# Patient Record
Sex: MALE | Race: WHITE | NOT HISPANIC OR LATINO | Employment: FULL TIME | URBAN - METROPOLITAN AREA
[De-identification: names, ages, dates, MRNs, and addresses within clinical notes are randomized per-mention and may not be internally consistent; named-entity substitution may affect disease eponyms.]

---

## 2023-08-07 ENCOUNTER — LAB (OUTPATIENT)
Dept: LAB | Facility: LAB | Age: 63
End: 2023-08-07
Payer: COMMERCIAL

## 2023-08-07 ENCOUNTER — OFFICE VISIT (OUTPATIENT)
Dept: PRIMARY CARE | Facility: CLINIC | Age: 63
End: 2023-08-07
Payer: COMMERCIAL

## 2023-08-07 VITALS
DIASTOLIC BLOOD PRESSURE: 64 MMHG | WEIGHT: 111.4 LBS | HEIGHT: 70 IN | OXYGEN SATURATION: 96 % | BODY MASS INDEX: 15.95 KG/M2 | SYSTOLIC BLOOD PRESSURE: 122 MMHG | HEART RATE: 58 BPM

## 2023-08-07 DIAGNOSIS — Z12.5 SCREENING PSA (PROSTATE SPECIFIC ANTIGEN): ICD-10-CM

## 2023-08-07 DIAGNOSIS — R79.89 ABNORMAL CBC: Primary | ICD-10-CM

## 2023-08-07 DIAGNOSIS — R73.09 ELEVATED GLUCOSE LEVEL: ICD-10-CM

## 2023-08-07 DIAGNOSIS — J43.2 CENTRILOBULAR EMPHYSEMA (MULTI): ICD-10-CM

## 2023-08-07 DIAGNOSIS — E16.9 DISORDER OF PANCREATIC INTERNAL SECRETION (HHS-HCC): ICD-10-CM

## 2023-08-07 DIAGNOSIS — E55.9 VITAMIN D DEFICIENCY: ICD-10-CM

## 2023-08-07 DIAGNOSIS — D69.6 THROMBOCYTOPENIA (CMS-HCC): ICD-10-CM

## 2023-08-07 DIAGNOSIS — J43.2 CENTRILOBULAR EMPHYSEMA (MULTI): Primary | ICD-10-CM

## 2023-08-07 PROBLEM — R91.8 LUNG NODULES: Status: ACTIVE | Noted: 2023-08-07

## 2023-08-07 PROBLEM — G47.33 OBSTRUCTIVE SLEEP APNEA, ADULT: Status: ACTIVE | Noted: 2023-08-07

## 2023-08-07 PROBLEM — K86.3 PANCREATIC PSEUDOCYST (HHS-HCC): Status: ACTIVE | Noted: 2023-08-07

## 2023-08-07 PROBLEM — K86.1 CHRONIC PANCREATITIS (MULTI): Status: ACTIVE | Noted: 2023-08-07

## 2023-08-07 PROBLEM — K86.89 SECONDARY PANCREATIC INSUFFICIENCY (HHS-HCC): Status: ACTIVE | Noted: 2023-08-07

## 2023-08-07 LAB
ALANINE AMINOTRANSFERASE (SGPT) (U/L) IN SER/PLAS: 16 U/L (ref 10–52)
ALBUMIN (G/DL) IN SER/PLAS: 4.1 G/DL (ref 3.4–5)
ALKALINE PHOSPHATASE (U/L) IN SER/PLAS: 130 U/L (ref 33–136)
ANION GAP IN SER/PLAS: 9 MMOL/L (ref 10–20)
ASPARTATE AMINOTRANSFERASE (SGOT) (U/L) IN SER/PLAS: 18 U/L (ref 9–39)
BASOPHILS (10*3/UL) IN BLOOD BY AUTOMATED COUNT: 0.08 X10E9/L (ref 0–0.1)
BASOPHILS/100 LEUKOCYTES IN BLOOD BY AUTOMATED COUNT: 1.3 % (ref 0–2)
BILIRUBIN TOTAL (MG/DL) IN SER/PLAS: 0.6 MG/DL (ref 0–1.2)
CALCIDIOL (25 OH VITAMIN D3) (NG/ML) IN SER/PLAS: <7 NG/ML
CALCIUM (MG/DL) IN SER/PLAS: 9.1 MG/DL (ref 8.6–10.3)
CARBON DIOXIDE, TOTAL (MMOL/L) IN SER/PLAS: 33 MMOL/L (ref 21–32)
CHLORIDE (MMOL/L) IN SER/PLAS: 103 MMOL/L (ref 98–107)
CHOLESTEROL (MG/DL) IN SER/PLAS: 156 MG/DL (ref 0–199)
CHOLESTEROL IN HDL (MG/DL) IN SER/PLAS: 48 MG/DL
CHOLESTEROL/HDL RATIO: 3.3
COBALAMIN (VITAMIN B12) (PG/ML) IN SER/PLAS: 307 PG/ML (ref 211–911)
CREATININE (MG/DL) IN SER/PLAS: 0.7 MG/DL (ref 0.5–1.3)
EOSINOPHILS (10*3/UL) IN BLOOD BY AUTOMATED COUNT: 0.23 X10E9/L (ref 0–0.7)
EOSINOPHILS/100 LEUKOCYTES IN BLOOD BY AUTOMATED COUNT: 3.8 % (ref 0–6)
ERYTHROCYTE DISTRIBUTION WIDTH (RATIO) BY AUTOMATED COUNT: 13.1 % (ref 11.5–14.5)
ERYTHROCYTE MEAN CORPUSCULAR HEMOGLOBIN CONCENTRATION (G/DL) BY AUTOMATED: 32 G/DL (ref 32–36)
ERYTHROCYTE MEAN CORPUSCULAR VOLUME (FL) BY AUTOMATED COUNT: 92 FL (ref 80–100)
ERYTHROCYTES (10*6/UL) IN BLOOD BY AUTOMATED COUNT: 5.13 X10E12/L (ref 4.5–5.9)
ESTIMATED AVERAGE GLUCOSE FOR HBA1C: 134 MG/DL
GFR MALE: >90 ML/MIN/1.73M2
GLUCOSE (MG/DL) IN SER/PLAS: 106 MG/DL (ref 74–99)
HEMATOCRIT (%) IN BLOOD BY AUTOMATED COUNT: 47.2 % (ref 41–52)
HEMOGLOBIN (G/DL) IN BLOOD: 15.1 G/DL (ref 13.5–17.5)
HEMOGLOBIN A1C/HEMOGLOBIN TOTAL IN BLOOD: 6.3 %
IMMATURE GRANULOCYTES/100 LEUKOCYTES IN BLOOD BY AUTOMATED COUNT: 0.5 % (ref 0–0.9)
LDL: 94 MG/DL (ref 0–99)
LEUKOCYTES (10*3/UL) IN BLOOD BY AUTOMATED COUNT: 6.1 X10E9/L (ref 4.4–11.3)
LYMPHOCYTES (10*3/UL) IN BLOOD BY AUTOMATED COUNT: 1.59 X10E9/L (ref 1.2–4.8)
LYMPHOCYTES/100 LEUKOCYTES IN BLOOD BY AUTOMATED COUNT: 26.3 % (ref 13–44)
MAGNESIUM (MG/DL) IN SER/PLAS: 1.93 MG/DL (ref 1.6–2.4)
MONOCYTES (10*3/UL) IN BLOOD BY AUTOMATED COUNT: 0.47 X10E9/L (ref 0.1–1)
MONOCYTES/100 LEUKOCYTES IN BLOOD BY AUTOMATED COUNT: 7.8 % (ref 2–10)
NEUTROPHILS (10*3/UL) IN BLOOD BY AUTOMATED COUNT: 3.65 X10E9/L (ref 1.2–7.7)
NEUTROPHILS/100 LEUKOCYTES IN BLOOD BY AUTOMATED COUNT: 60.3 % (ref 40–80)
PLATELET CLUMP (PRESENCE) IN BLOOD BY LIGHT MICROSCOPY: PRESENT
PLATELETS (10*3/UL) IN BLOOD AUTOMATED COUNT: 28 X10E9/L (ref 150–450)
POTASSIUM (MMOL/L) IN SER/PLAS: 4.2 MMOL/L (ref 3.5–5.3)
PROSTATE SPECIFIC AG (NG/ML) IN SER/PLAS: 0.77 NG/ML (ref 0–4)
PROTEIN TOTAL: 6.3 G/DL (ref 6.4–8.2)
RBC MORPHOLOGY IN BLOOD: NORMAL
SODIUM (MMOL/L) IN SER/PLAS: 141 MMOL/L (ref 136–145)
THYROTROPIN (MIU/L) IN SER/PLAS BY DETECTION LIMIT <= 0.05 MIU/L: 1.07 MIU/L (ref 0.44–3.98)
TRIGLYCERIDE (MG/DL) IN SER/PLAS: 72 MG/DL (ref 0–149)
UREA NITROGEN (MG/DL) IN SER/PLAS: 9 MG/DL (ref 6–23)
VLDL: 14 MG/DL (ref 0–40)

## 2023-08-07 PROCEDURE — 80061 LIPID PANEL: CPT

## 2023-08-07 PROCEDURE — 83036 HEMOGLOBIN GLYCOSYLATED A1C: CPT

## 2023-08-07 PROCEDURE — 84443 ASSAY THYROID STIM HORMONE: CPT

## 2023-08-07 PROCEDURE — 85025 COMPLETE CBC W/AUTO DIFF WBC: CPT

## 2023-08-07 PROCEDURE — 36415 COLL VENOUS BLD VENIPUNCTURE: CPT

## 2023-08-07 PROCEDURE — 1036F TOBACCO NON-USER: CPT | Performed by: FAMILY MEDICINE

## 2023-08-07 PROCEDURE — 84153 ASSAY OF PSA TOTAL: CPT

## 2023-08-07 PROCEDURE — 82607 VITAMIN B-12: CPT

## 2023-08-07 PROCEDURE — 80053 COMPREHEN METABOLIC PANEL: CPT

## 2023-08-07 PROCEDURE — 83735 ASSAY OF MAGNESIUM: CPT

## 2023-08-07 PROCEDURE — 99213 OFFICE O/P EST LOW 20 MIN: CPT | Performed by: FAMILY MEDICINE

## 2023-08-07 PROCEDURE — 82306 VITAMIN D 25 HYDROXY: CPT

## 2023-08-07 RX ORDER — PANCRELIPASE 36000; 180000; 114000 [USP'U]/1; [USP'U]/1; [USP'U]/1
CAPSULE, DELAYED RELEASE PELLETS ORAL
COMMUNITY
End: 2023-08-07 | Stop reason: SDUPTHER

## 2023-08-07 RX ORDER — BUDESONIDE, GLYCOPYRROLATE, AND FORMOTEROL FUMARATE 160; 9; 4.8 UG/1; UG/1; UG/1
AEROSOL, METERED RESPIRATORY (INHALATION) EVERY 12 HOURS
COMMUNITY
Start: 2021-04-22 | End: 2024-05-29 | Stop reason: SDUPTHER

## 2023-08-07 RX ORDER — PANCRELIPASE 36000; 180000; 114000 [USP'U]/1; [USP'U]/1; [USP'U]/1
2 CAPSULE, DELAYED RELEASE PELLETS ORAL
Qty: 180 CAPSULE | Refills: 5 | Status: SHIPPED | OUTPATIENT
Start: 2023-08-07 | End: 2023-08-22

## 2023-08-07 RX ORDER — ALBUTEROL SULFATE 90 UG/1
2 AEROSOL, METERED RESPIRATORY (INHALATION) EVERY 4 HOURS PRN
COMMUNITY
End: 2024-05-29 | Stop reason: SDUPTHER

## 2023-08-07 ASSESSMENT — PATIENT HEALTH QUESTIONNAIRE - PHQ9
SUM OF ALL RESPONSES TO PHQ9 QUESTIONS 1 AND 2: 0
2. FEELING DOWN, DEPRESSED OR HOPELESS: NOT AT ALL
1. LITTLE INTEREST OR PLEASURE IN DOING THINGS: NOT AT ALL

## 2023-08-07 NOTE — PROGRESS NOTES
Subjective   Dallin Crawford is a 63 y.o. male who presents for No chief complaint on file..  Here for routine f/u pancreatic insufficiency, COPD (Dr Whitfield).  He states that he is doing reasonably well.      Colonoscopy 10/21 - repeat in 5 years.            Objective   Visit Vitals  /64 (BP Location: Left arm, Patient Position: Sitting, BP Cuff Size: Adult)   Pulse 58      Physical Exam  Vitals reviewed.   Constitutional:       General: He is not in acute distress.  Cardiovascular:      Rate and Rhythm: Normal rate and regular rhythm.      Heart sounds: No murmur heard.  Pulmonary:      Effort: Pulmonary effort is normal. No respiratory distress.      Breath sounds: Normal breath sounds.   Skin:     General: Skin is warm and dry.   Neurological:      General: No focal deficit present.      Mental Status: He is alert. Mental status is at baseline.         Assessment/Plan   Problem List Items Addressed This Visit       Centrilobular emphysema (CMS/HCC) - Primary    Relevant Orders    CBC and Auto Differential    Comprehensive Metabolic Panel    Lipid Panel    TSH with reflex to Free T4 if abnormal    Vitamin B12    Hemoglobin A1C    Magnesium    Vitamin D, Total    Follow Up In Primary Care - Established    Disorder of pancreatic internal secretion    Relevant Medications    pancrelipase, Lip-Prot-Amyl, (Creon) 36,000-114,000- 180,000 unit capsule,delayed release(DR/EC) capsule    Other Relevant Orders    CBC and Auto Differential    Comprehensive Metabolic Panel    Lipid Panel    TSH with reflex to Free T4 if abnormal    Vitamin B12    Hemoglobin A1C    Magnesium    Vitamin D, Total    Follow Up In Primary Care - Established    Elevated glucose level    Relevant Orders    CBC and Auto Differential    Comprehensive Metabolic Panel    Lipid Panel    TSH with reflex to Free T4 if abnormal    Vitamin B12    Hemoglobin A1C    Magnesium    Vitamin D, Total    Follow Up In Primary Care - Established     Thrombocytopenia (CMS/HCC)    Relevant Orders    CBC and Auto Differential    Comprehensive Metabolic Panel    Lipid Panel    TSH with reflex to Free T4 if abnormal    Vitamin B12    Hemoglobin A1C    Magnesium    Vitamin D, Total    Follow Up In Primary Care - Established     Other Visit Diagnoses       Screening PSA (prostate specific antigen)        Relevant Orders    Prostate Specific Antigen    Vitamin D deficiency        Relevant Orders    Vitamin D, Total    Follow Up In Primary Care - Established               Sona Delong MD

## 2023-08-07 NOTE — RESULT ENCOUNTER NOTE
Please let patient know that we need to repeat one of his labs - he does not need to be fasting for this.  The platelet count was low, but there also appeared to be an issue with the tube it was drawn in.  Thanks.

## 2023-08-07 NOTE — PROGRESS NOTES
Subjective   Patient ID: Dallin Crawford is a 63 y.o. male who presents for 6 month follow up. No chief complaint on file..    HPI     Review of Systems    Objective   There were no vitals taken for this visit.    Physical Exam    Assessment/Plan

## 2023-08-07 NOTE — PATIENT INSTRUCTIONS
Get labs today.  Continue current medications.  Follow up with specialists as scheduled.  Follow up in 6 months, sooner if needed.

## 2023-08-14 ENCOUNTER — LAB (OUTPATIENT)
Dept: LAB | Facility: LAB | Age: 63
End: 2023-08-14
Payer: COMMERCIAL

## 2023-08-14 DIAGNOSIS — D69.6 THROMBOCYTOPENIA (CMS-HCC): Primary | ICD-10-CM

## 2023-08-14 DIAGNOSIS — R79.89 ABNORMAL CBC: ICD-10-CM

## 2023-08-14 LAB
BASOPHILS (10*3/UL) IN BLOOD BY AUTOMATED COUNT: 0.07 X10E9/L (ref 0–0.1)
BASOPHILS/100 LEUKOCYTES IN BLOOD BY AUTOMATED COUNT: 1.2 % (ref 0–2)
EOSINOPHILS (10*3/UL) IN BLOOD BY AUTOMATED COUNT: 0.21 X10E9/L (ref 0–0.7)
EOSINOPHILS/100 LEUKOCYTES IN BLOOD BY AUTOMATED COUNT: 3.7 % (ref 0–6)
ERYTHROCYTE DISTRIBUTION WIDTH (RATIO) BY AUTOMATED COUNT: 13.1 % (ref 11.5–14.5)
ERYTHROCYTE MEAN CORPUSCULAR HEMOGLOBIN CONCENTRATION (G/DL) BY AUTOMATED: 31.7 G/DL (ref 32–36)
ERYTHROCYTE MEAN CORPUSCULAR VOLUME (FL) BY AUTOMATED COUNT: 93 FL (ref 80–100)
ERYTHROCYTES (10*6/UL) IN BLOOD BY AUTOMATED COUNT: 4.96 X10E12/L (ref 4.5–5.9)
HEMATOCRIT (%) IN BLOOD BY AUTOMATED COUNT: 46 % (ref 41–52)
HEMOGLOBIN (G/DL) IN BLOOD: 14.6 G/DL (ref 13.5–17.5)
IMMATURE GRANULOCYTES/100 LEUKOCYTES IN BLOOD BY AUTOMATED COUNT: 0.5 % (ref 0–0.9)
LEUKOCYTES (10*3/UL) IN BLOOD BY AUTOMATED COUNT: 5.6 X10E9/L (ref 4.4–11.3)
LYMPHOCYTES (10*3/UL) IN BLOOD BY AUTOMATED COUNT: 1.65 X10E9/L (ref 1.2–4.8)
LYMPHOCYTES/100 LEUKOCYTES IN BLOOD BY AUTOMATED COUNT: 29.4 % (ref 13–44)
MONOCYTES (10*3/UL) IN BLOOD BY AUTOMATED COUNT: 0.56 X10E9/L (ref 0.1–1)
MONOCYTES/100 LEUKOCYTES IN BLOOD BY AUTOMATED COUNT: 10 % (ref 2–10)
NEUTROPHILS (10*3/UL) IN BLOOD BY AUTOMATED COUNT: 3.09 X10E9/L (ref 1.2–7.7)
NEUTROPHILS/100 LEUKOCYTES IN BLOOD BY AUTOMATED COUNT: 55.2 % (ref 40–80)
PLATELETS (10*3/UL) IN BLOOD AUTOMATED COUNT: 26 X10E9/L (ref 150–450)

## 2023-08-14 PROCEDURE — 85025 COMPLETE CBC W/AUTO DIFF WBC: CPT

## 2023-08-14 PROCEDURE — 36415 COLL VENOUS BLD VENIPUNCTURE: CPT

## 2023-08-14 NOTE — TELEPHONE ENCOUNTER
----- Message from Sona Delong MD sent at 8/14/2023 12:31 PM EDT -----  Please let patient know that his platelet count is still very low on repeat test and I would recommend with get him in with hematology asap.  Thanks.

## 2023-08-14 NOTE — RESULT ENCOUNTER NOTE
Please let patient know that his platelet count is still very low on repeat test and I would recommend with get him in with hematology asap.  Thanks.

## 2023-08-22 RX ORDER — PANCRELIPASE 36000; 180000; 114000 [USP'U]/1; [USP'U]/1; [USP'U]/1
2 CAPSULE, DELAYED RELEASE PELLETS ORAL
Qty: 180 CAPSULE | Refills: 5 | Status: SHIPPED | OUTPATIENT
Start: 2023-08-22 | End: 2023-10-23

## 2023-08-23 DIAGNOSIS — E16.9 DISORDER OF PANCREATIC INTERNAL SECRETION (HHS-HCC): ICD-10-CM

## 2023-08-23 RX ORDER — PANCRELIPASE 36000; 180000; 114000 [USP'U]/1; [USP'U]/1; [USP'U]/1
2 CAPSULE, DELAYED RELEASE PELLETS ORAL
Qty: 180 CAPSULE | Refills: 5 | OUTPATIENT
Start: 2023-08-23

## 2023-10-21 DIAGNOSIS — E16.9 DISORDER OF PANCREATIC INTERNAL SECRETION (HHS-HCC): ICD-10-CM

## 2023-10-23 RX ORDER — PANCRELIPASE 36000; 180000; 114000 [USP'U]/1; [USP'U]/1; [USP'U]/1
2 CAPSULE, DELAYED RELEASE PELLETS ORAL
Qty: 180 CAPSULE | Refills: 5 | Status: SHIPPED | OUTPATIENT
Start: 2023-10-23 | End: 2024-01-04

## 2023-11-20 ENCOUNTER — OFFICE VISIT (OUTPATIENT)
Dept: PULMONOLOGY | Facility: CLINIC | Age: 63
End: 2023-11-20
Payer: COMMERCIAL

## 2023-11-20 VITALS
HEART RATE: 90 BPM | OXYGEN SATURATION: 96 % | DIASTOLIC BLOOD PRESSURE: 73 MMHG | BODY MASS INDEX: 15.47 KG/M2 | TEMPERATURE: 96 F | SYSTOLIC BLOOD PRESSURE: 115 MMHG | WEIGHT: 107.8 LBS

## 2023-11-20 DIAGNOSIS — J43.2 CENTRILOBULAR EMPHYSEMA (MULTI): Primary | ICD-10-CM

## 2023-11-20 DIAGNOSIS — R91.8 LUNG NODULES: ICD-10-CM

## 2023-11-20 PROCEDURE — 1036F TOBACCO NON-USER: CPT | Performed by: INTERNAL MEDICINE

## 2023-11-20 PROCEDURE — 99214 OFFICE O/P EST MOD 30 MIN: CPT | Performed by: INTERNAL MEDICINE

## 2023-11-20 NOTE — PROGRESS NOTES
Subjective   Patient ID: Dallin Crawford is a 63 y.o. male who presents for Lung Nodule and Emphysema.  HPI  Patient was seen today in the office on a 3-month follow-up visit for his severe emphysema and also review of his upcoming screen for the lung nodules.  Patient is currently on Breztri at 2 and elations twice a day and albuterol inhaler 2 puffs 4 times daily as needed shortness of breath.  He uses a spacer with both inhalers.    Patient denies any cough or sputum production or wheezing at this time he does have shortness of breath walking from the office to his car and with a flight of stairs at home.  Unfortunately he is still smoking 1 pack/day.  We did discuss smoking cessation clinic at the hospital and I gave him a flyer from them.  I also talked to him about using nicotine lozenges which she has never heard of before and that that might help with his addiction to the nicotine.  Review of Systems  Patient denies having any fever, chills, rhinitis or sore throat.  No contributory symptoms.  Refer to the HPI.  Objective   Physical Exam  HEENT, there is no inflammation on examination of the oral pharynx.  Pulmonary, decreased breath sounds bilaterally and clear to auscultation.  Cardio, heart sounds are regular rate and rhythm.  Extremities, no cyanosis, clubbing or pretibial edema.  Psych, the patient is alert and oriented x3.  Patient is not dyspneic with activity in the office today.  Assessment/Plan        Impressions:  1.  Severe emphysema.  2.  Lung nodules.  Recommendations:  1.  We have him scheduled for an LDCT with his follow-up appointment in April of next year which will be a 1 year exam.  5 months from this office visit.  2.  Continue with present bronchodilator therapy.      This note was transcribed using the Dragon Dictation system.  There may be grammatical, punctuation, or verbiage errors that occur with voice recognition programs.

## 2023-12-11 ENCOUNTER — LAB (OUTPATIENT)
Dept: LAB | Facility: LAB | Age: 63
End: 2023-12-11
Payer: COMMERCIAL

## 2023-12-11 DIAGNOSIS — D69.6 THROMBOCYTOPENIA, UNSPECIFIED (CMS-HCC): Primary | ICD-10-CM

## 2023-12-11 LAB
ALBUMIN SERPL BCP-MCNC: 4.2 G/DL (ref 3.4–5)
ALP SERPL-CCNC: 133 U/L (ref 33–136)
ALT SERPL W P-5'-P-CCNC: 17 U/L (ref 10–52)
ANION GAP SERPL CALC-SCNC: 10 MMOL/L (ref 10–20)
AST SERPL W P-5'-P-CCNC: 19 U/L (ref 9–39)
BASOPHILS # BLD AUTO: 0.13 X10*3/UL (ref 0–0.1)
BASOPHILS NFR BLD AUTO: 1.6 %
BILIRUB SERPL-MCNC: 0.6 MG/DL (ref 0–1.2)
BUN SERPL-MCNC: 7 MG/DL (ref 6–23)
CALCIUM SERPL-MCNC: 9.4 MG/DL (ref 8.6–10.3)
CHLORIDE SERPL-SCNC: 103 MMOL/L (ref 98–107)
CO2 SERPL-SCNC: 35 MMOL/L (ref 21–32)
CREAT SERPL-MCNC: 0.73 MG/DL (ref 0.5–1.3)
EOSINOPHIL # BLD AUTO: 0.23 X10*3/UL (ref 0–0.7)
EOSINOPHIL NFR BLD AUTO: 2.9 %
ERYTHROCYTE [DISTWIDTH] IN BLOOD BY AUTOMATED COUNT: 12.9 % (ref 11.5–14.5)
FERRITIN SERPL-MCNC: 47 NG/ML (ref 20–300)
FOLATE SERPL-MCNC: 18.3 NG/ML
GFR SERPL CREATININE-BSD FRML MDRD: >90 ML/MIN/1.73M*2
GLUCOSE SERPL-MCNC: 108 MG/DL (ref 74–99)
HCT VFR BLD AUTO: 47.1 % (ref 41–52)
HGB BLD-MCNC: 15.1 G/DL (ref 13.5–17.5)
IMM GRANULOCYTES # BLD AUTO: 0.06 X10*3/UL (ref 0–0.7)
IMM GRANULOCYTES NFR BLD AUTO: 0.7 % (ref 0–0.9)
IRON SATN MFR SERPL: 23 % (ref 25–45)
IRON SERPL-MCNC: 83 UG/DL (ref 35–150)
LYMPHOCYTES # BLD AUTO: 2.29 X10*3/UL (ref 1.2–4.8)
LYMPHOCYTES NFR BLD AUTO: 28.5 %
MCH RBC QN AUTO: 29.5 PG (ref 26–34)
MCHC RBC AUTO-ENTMCNC: 32.1 G/DL (ref 32–36)
MCV RBC AUTO: 92 FL (ref 80–100)
MONOCYTES # BLD AUTO: 0.67 X10*3/UL (ref 0.1–1)
MONOCYTES NFR BLD AUTO: 8.3 %
NEUTROPHILS # BLD AUTO: 4.66 X10*3/UL (ref 1.2–7.7)
NEUTROPHILS NFR BLD AUTO: 58 %
NRBC BLD-RTO: 0 /100 WBCS (ref 0–0)
PLATELET # BLD AUTO: 235 X10*3/UL (ref 150–450)
POTASSIUM SERPL-SCNC: 5.5 MMOL/L (ref 3.5–5.3)
PROT SERPL-MCNC: 6.6 G/DL (ref 6.4–8.2)
RBC # BLD AUTO: 5.11 X10*6/UL (ref 4.5–5.9)
SODIUM SERPL-SCNC: 142 MMOL/L (ref 136–145)
TIBC SERPL-MCNC: 368 UG/DL (ref 240–445)
UIBC SERPL-MCNC: 285 UG/DL (ref 110–370)
VIT B12 SERPL-MCNC: 467 PG/ML (ref 211–911)
WBC # BLD AUTO: 8 X10*3/UL (ref 4.4–11.3)

## 2023-12-11 PROCEDURE — 36415 COLL VENOUS BLD VENIPUNCTURE: CPT

## 2023-12-11 PROCEDURE — 83550 IRON BINDING TEST: CPT

## 2023-12-11 PROCEDURE — 82728 ASSAY OF FERRITIN: CPT

## 2023-12-11 PROCEDURE — 82746 ASSAY OF FOLIC ACID SERUM: CPT

## 2023-12-11 PROCEDURE — 83540 ASSAY OF IRON: CPT

## 2023-12-11 PROCEDURE — 82607 VITAMIN B-12: CPT

## 2023-12-11 PROCEDURE — 85025 COMPLETE CBC W/AUTO DIFF WBC: CPT

## 2023-12-11 PROCEDURE — 80053 COMPREHEN METABOLIC PANEL: CPT

## 2023-12-18 ENCOUNTER — OFFICE VISIT (OUTPATIENT)
Dept: HEMATOLOGY/ONCOLOGY | Facility: CLINIC | Age: 63
End: 2023-12-18
Payer: COMMERCIAL

## 2023-12-18 VITALS
OXYGEN SATURATION: 97 % | SYSTOLIC BLOOD PRESSURE: 135 MMHG | HEART RATE: 60 BPM | DIASTOLIC BLOOD PRESSURE: 78 MMHG | BODY MASS INDEX: 16.03 KG/M2 | RESPIRATION RATE: 20 BRPM | WEIGHT: 112 LBS | HEIGHT: 70 IN

## 2023-12-18 DIAGNOSIS — D69.6 THROMBOCYTOPENIA (CMS-HCC): Primary | ICD-10-CM

## 2023-12-18 PROCEDURE — 1036F TOBACCO NON-USER: CPT

## 2023-12-18 PROCEDURE — 99213 OFFICE O/P EST LOW 20 MIN: CPT

## 2023-12-18 ASSESSMENT — PAIN SCALES - GENERAL: PAINLEVEL: 0-NO PAIN

## 2023-12-18 NOTE — PROGRESS NOTES
RTC 5/20/24 AT 9AM FOR FOLLOWUP  INSTRUCTED TO GET LABS THE WEEK PRIOR AT THE [facility]  Reviewed AVS with patient- patient verbalizes understanding

## 2023-12-18 NOTE — PROGRESS NOTES
Patient ID: Dallin Crawford is a 63 y.o. male.    Subjective    HPI  Chief Complaint: Thrombocytopenia   Interval History:    Initial consult: 8/28/23  Reason: Thrombocyopenia  Referred: Sona Delong        Patient is a 62 yo male with a PMH of Emphysema Pancreas Insufficiency, DARIAN, Lung Nodules, Hypoxia and was referred to benign hematology for consultation of thrombocytopenia.      According to our records since November 2020 patient platelets have ranged between 26k-216k.  During this time frame the patients platelets have been reported low four  separate times as indicated, 137k as of May 2022, 138k as of July 2022 and most recently  28k on 8/7/23 and 26k on 8/14/23.      Patient has a history of whipple procedure in 2015 due to having precancerous pancreatic cysts. He remains on creon for pancreatic insuffiencey.  Prior to the surgery he had a history of consuming large amounts of alcohol.      Today, patient presents for initial consultation. Chronic fatigue, sleeps an average of 5 hours daily due to work schedule. Appetite varies-but is mostly decent, sometimes gets full fast, not a new finding since whipple surgery. Patient denies any spontaneous  or major bleeding events, but does bruise easier, especially in the last several months. Weight loss of 4-5lbs over the past month. Occasional Diarrhea if he forgets to take creon or food doesn't agree with him. No urinary issues. No hematochezia, melena  or hematuria. SOB that worsens with activity, occasional productive cough with green to yellow sputum. Denies fevers, chills or night sweats. Denies fatigue, chills, sob, cp, n/v/d, n/t. No PICA. Denies any abnormal bleeding or bruising. No recurrent  infections or lymphadenopathy. No joint/body pain. No known blood disorders in family. Has had surgery in past w/o issue. Never had blood/blood products. Denies NSAID use.      Colonoscopy approximately 2years ago- polyps were found- denies anything  "malicious      Interval History 12/18/23    Energy is decent, fatigued due to long days at work  Appetite is good, eating and drinking well    ROONEY, with any activity   Ppd daily     Slight weight gain of 5 lbs   No nausea/vomiting  No Diarrhea or constipation  No urinary issues  No numbness or tingling in hands or feet   No fevers, night sweats                   Objective    BSA: 1.58 meters squared  /78 (BP Location: Left arm, Patient Position: Sitting, BP Cuff Size: Adult)   Pulse 60   Resp 20   Ht 1.778 m (5' 10\")   Wt 50.8 kg (112 lb)   SpO2 97%   BMI 16.07 kg/m²      Physical Exam  Vitals and nursing note reviewed.   Constitutional:       Appearance: Normal appearance.   HENT:      Head: Normocephalic and atraumatic.      Mouth/Throat:      Mouth: Mucous membranes are moist.      Pharynx: Oropharynx is clear.   Eyes:      General: No scleral icterus.     Extraocular Movements: Extraocular movements intact.      Conjunctiva/sclera: Conjunctivae normal.   Cardiovascular:      Rate and Rhythm: Normal rate and regular rhythm.      Pulses: Normal pulses.      Heart sounds: Normal heart sounds.   Pulmonary:      Effort: Pulmonary effort is normal. No respiratory distress.      Breath sounds: Normal breath sounds.   Abdominal:      General: Bowel sounds are normal. There is no distension.      Palpations: Abdomen is soft.   Musculoskeletal:         General: No swelling. Normal range of motion.      Cervical back: Normal range of motion and neck supple.   Skin:     General: Skin is warm and dry.   Neurological:      General: No focal deficit present.      Mental Status: He is alert and oriented to person, place, and time.   Psychiatric:         Mood and Affect: Mood normal.         Behavior: Behavior normal.         Thought Content: Thought content normal.         Judgment: Judgment normal.         Performance Status:  Asymptomatic      Assessment/Plan      Assessment:    HEME-ONC ASSESSMENT AND PLAN   "   PROBLEMS:  1. Mild thrombocytopenia  - 5-2-22 Platelets 137k. Hgb 15.0, WBC 7.8  - 7-19-22 Platelets 138k, Hgb 15.2, WBC 6.0  -8-7-23 Platelets 28k, Hgb 15.1, WBC 6.1  -8-14-23 Platelets 26k, Hgb 14.6, WBC 5.6  -12-11-23 Platelets 235k, Hgb 15.1, WBC 8.0  -12-11-23 Fe 83, Fe sat 23%,      2. Pancreatic Insufficiency-   -2015 Whipple procedure due to benign pancreatic cysts  - takes Creon daily prior to meals     3. Lung Nodules  - 3-27-23- CT scan noted emphysematous changes, no suspicious parenchymal lung nodules  - managed by Pulmonology      4. Emphysema  - 3-27-23- CT scan reported sever emphysematous changes with adherent mucus  - ppd smoker  - managed by pulmonology         CHUCKY COMMENTS:   -Patient is noted to have a mild thrombocytopenia four times since 2022.  He has remained asymptomatic without any bleeding competitions.  Patient has not had any recent exposure to heparin.  I have a low suspicion for thrombotic microangiopathy but we'll  check a peripheral smear, LDH and a haptoglobin to assess for any hemolysis though it is reassuring that his hemoglobin is normal.  Nutrition deficiencies are also in the differential and will check iron, folate and B12 today.  Underlying bone marrow  condition is less likely given he does not have any other cytopenias.      Immune thrombocytopenia is also in the differential however there are no obvious precipitating factors such as recent infection or drugs.  Given his age it is not likely to have a new autoimmune condition that would precipitate this.     - Discussed possible etiologies including multifactorial, nutritional deficiencies, anemia of chronic disease, malabsorption, hemopathy, medications, bleeding, malignancy, etc. Will start hematological workup today.    12/18/23- Patient in office today with family. WBC remains corrected and stable at 8.0. Patient is doing well. Denies any new clinical concerns or issues. Remainder of CBC is stable. Iron parameters  are stable, no nutritional deficiency indicated. B12 is stable at 472.  Continue taking B12 as same.  Will continue to serial monitor patient CBC, otherwise no further intervention is needed at this time.      PLANS:  - Labs today CBC w/diff, CMP, B12, Folate, Iron Panel, Ferritin  - repeat CBC, CMP prior to follow-up  - Follow-up in 4-5 month              NEGRO Ashley-CNP

## 2024-01-04 DIAGNOSIS — E16.9 DISORDER OF PANCREATIC INTERNAL SECRETION (HHS-HCC): ICD-10-CM

## 2024-01-04 RX ORDER — PANCRELIPASE 36000; 180000; 114000 [USP'U]/1; [USP'U]/1; [USP'U]/1
2 CAPSULE, DELAYED RELEASE PELLETS ORAL
Qty: 180 CAPSULE | Refills: 5 | Status: SHIPPED | OUTPATIENT
Start: 2024-01-04 | End: 2024-01-05

## 2024-01-05 DIAGNOSIS — E16.9 DISORDER OF PANCREATIC INTERNAL SECRETION (HHS-HCC): ICD-10-CM

## 2024-01-05 RX ORDER — PANCRELIPASE 36000; 180000; 114000 [USP'U]/1; [USP'U]/1; [USP'U]/1
2 CAPSULE, DELAYED RELEASE PELLETS ORAL
Qty: 200 CAPSULE | Refills: 5 | Status: SHIPPED | OUTPATIENT
Start: 2024-01-05 | End: 2024-04-29

## 2024-02-05 ENCOUNTER — OFFICE VISIT (OUTPATIENT)
Dept: PRIMARY CARE | Facility: CLINIC | Age: 64
End: 2024-02-05
Payer: COMMERCIAL

## 2024-02-05 VITALS
WEIGHT: 110.5 LBS | HEIGHT: 70 IN | BODY MASS INDEX: 15.82 KG/M2 | OXYGEN SATURATION: 94 % | DIASTOLIC BLOOD PRESSURE: 74 MMHG | SYSTOLIC BLOOD PRESSURE: 126 MMHG | HEART RATE: 64 BPM

## 2024-02-05 DIAGNOSIS — D69.6 THROMBOCYTOPENIA (CMS-HCC): ICD-10-CM

## 2024-02-05 DIAGNOSIS — J43.2 CENTRILOBULAR EMPHYSEMA (MULTI): ICD-10-CM

## 2024-02-05 DIAGNOSIS — R73.09 ELEVATED GLUCOSE LEVEL: ICD-10-CM

## 2024-02-05 DIAGNOSIS — E55.9 VITAMIN D DEFICIENCY: ICD-10-CM

## 2024-02-05 DIAGNOSIS — E16.9 DISORDER OF PANCREATIC INTERNAL SECRETION (HHS-HCC): ICD-10-CM

## 2024-02-05 PROCEDURE — 99214 OFFICE O/P EST MOD 30 MIN: CPT | Performed by: FAMILY MEDICINE

## 2024-02-05 PROCEDURE — 1036F TOBACCO NON-USER: CPT | Performed by: FAMILY MEDICINE

## 2024-02-05 NOTE — PROGRESS NOTES
Subjective   Patient ID: Dallin Crawford is a 63 y.o. male who presents for 6 month check up    HPI     Review of Systems    Objective   There were no vitals taken for this visit.    Physical Exam    Assessment/Plan

## 2024-02-05 NOTE — PROGRESS NOTES
Subjective   Dallin Crawford is a 63 y.o. male who presents for No chief complaint on file..  Here for routine f/u pancreatic insufficiency, COPD (Dr Whitfield).  He states that he is doing reasonably well.  He is having issues gaining weight.  He has a good appetite.  We will check some labs.  He had CT last fall - no concerns for malignancy.        Colonoscopy 10/21 - repeat in 5 years.              Objective   Visit Vitals  /74 (BP Location: Left arm, Patient Position: Sitting, BP Cuff Size: Adult)   Pulse 64      Physical Exam  Vitals reviewed.   Constitutional:       General: He is not in acute distress.  Cardiovascular:      Rate and Rhythm: Normal rate and regular rhythm.      Heart sounds: No murmur heard.  Pulmonary:      Effort: Pulmonary effort is normal. No respiratory distress.      Breath sounds: Normal breath sounds.   Skin:     General: Skin is warm and dry.   Neurological:      General: No focal deficit present.      Mental Status: He is alert. Mental status is at baseline.         Assessment/Plan   Problem List Items Addressed This Visit       Centrilobular emphysema (CMS/HCC)    Relevant Orders    CBC and Auto Differential    Comprehensive Metabolic Panel    Hemoglobin A1C    Lipid Panel    TSH with reflex to Free T4 if abnormal    Vitamin B12    Vitamin D 25-Hydroxy,Total (for eval of Vitamin D levels)    Follow Up In Primary Care - Established    Disorder of pancreatic internal secretion    Relevant Orders    CBC and Auto Differential    Comprehensive Metabolic Panel    Hemoglobin A1C    Lipid Panel    TSH with reflex to Free T4 if abnormal    Vitamin B12    Vitamin D 25-Hydroxy,Total (for eval of Vitamin D levels)    Follow Up In Primary Care - Established    Elevated glucose level    Relevant Orders    CBC and Auto Differential    Comprehensive Metabolic Panel    Hemoglobin A1C    Lipid Panel    TSH with reflex to Free T4 if abnormal    Vitamin B12    Vitamin D 25-Hydroxy,Total (for  eval of Vitamin D levels)    Follow Up In Primary Care - Established    Thrombocytopenia (CMS/HCC)    Relevant Orders    CBC and Auto Differential    Comprehensive Metabolic Panel    Hemoglobin A1C    Lipid Panel    TSH with reflex to Free T4 if abnormal    Vitamin B12    Vitamin D 25-Hydroxy,Total (for eval of Vitamin D levels)    Follow Up In Primary Care - Established     Other Visit Diagnoses       Vitamin D deficiency        Relevant Orders    CBC and Auto Differential    Comprehensive Metabolic Panel    Hemoglobin A1C    Lipid Panel    TSH with reflex to Free T4 if abnormal    Vitamin B12    Vitamin D 25-Hydroxy,Total (for eval of Vitamin D levels)    Follow Up In Primary Care - Established               Sona Delong MD

## 2024-02-05 NOTE — PATIENT INSTRUCTIONS
Continue current medications.  Discussed adding Ensure/Boost to his diet.  Follow up with specialists as scheduled.  Follow up in 6 months, sooner if needed.

## 2024-02-12 ENCOUNTER — TELEPHONE (OUTPATIENT)
Dept: PRIMARY CARE | Facility: CLINIC | Age: 64
End: 2024-02-12

## 2024-02-12 ENCOUNTER — LAB (OUTPATIENT)
Dept: LAB | Facility: LAB | Age: 64
End: 2024-02-12
Payer: COMMERCIAL

## 2024-02-12 DIAGNOSIS — J43.2 CENTRILOBULAR EMPHYSEMA (MULTI): ICD-10-CM

## 2024-02-12 DIAGNOSIS — E55.9 VITAMIN D DEFICIENCY: ICD-10-CM

## 2024-02-12 DIAGNOSIS — D69.6 THROMBOCYTOPENIA (CMS-HCC): ICD-10-CM

## 2024-02-12 DIAGNOSIS — E16.9 DISORDER OF PANCREATIC INTERNAL SECRETION (HHS-HCC): ICD-10-CM

## 2024-02-12 DIAGNOSIS — R73.09 ELEVATED GLUCOSE LEVEL: ICD-10-CM

## 2024-02-12 LAB
25(OH)D3 SERPL-MCNC: 35 NG/ML (ref 30–100)
ALBUMIN SERPL BCP-MCNC: 4 G/DL (ref 3.4–5)
ALP SERPL-CCNC: 114 U/L (ref 33–136)
ALT SERPL W P-5'-P-CCNC: 17 U/L (ref 10–52)
ANION GAP SERPL CALC-SCNC: 10 MMOL/L (ref 10–20)
AST SERPL W P-5'-P-CCNC: 19 U/L (ref 9–39)
BASOPHILS # BLD AUTO: 0.13 X10*3/UL (ref 0–0.1)
BASOPHILS NFR BLD AUTO: 1.5 %
BILIRUB SERPL-MCNC: 0.6 MG/DL (ref 0–1.2)
BUN SERPL-MCNC: 11 MG/DL (ref 6–23)
CALCIUM SERPL-MCNC: 9.2 MG/DL (ref 8.6–10.3)
CHLORIDE SERPL-SCNC: 99 MMOL/L (ref 98–107)
CHOLEST SERPL-MCNC: 149 MG/DL (ref 0–199)
CHOLESTEROL/HDL RATIO: 2.7
CO2 SERPL-SCNC: 34 MMOL/L (ref 21–32)
CREAT SERPL-MCNC: 0.65 MG/DL (ref 0.5–1.3)
EGFRCR SERPLBLD CKD-EPI 2021: >90 ML/MIN/1.73M*2
EOSINOPHIL # BLD AUTO: 0.22 X10*3/UL (ref 0–0.7)
EOSINOPHIL NFR BLD AUTO: 2.6 %
ERYTHROCYTE [DISTWIDTH] IN BLOOD BY AUTOMATED COUNT: 12.8 % (ref 11.5–14.5)
EST. AVERAGE GLUCOSE BLD GHB EST-MCNC: 128 MG/DL
GLUCOSE SERPL-MCNC: 121 MG/DL (ref 74–99)
HBA1C MFR BLD: 6.1 %
HCT VFR BLD AUTO: 46.8 % (ref 41–52)
HDLC SERPL-MCNC: 56 MG/DL
HGB BLD-MCNC: 14.9 G/DL (ref 13.5–17.5)
IMM GRANULOCYTES # BLD AUTO: 0.04 X10*3/UL (ref 0–0.7)
IMM GRANULOCYTES NFR BLD AUTO: 0.5 % (ref 0–0.9)
LDLC SERPL CALC-MCNC: 75 MG/DL
LYMPHOCYTES # BLD AUTO: 2.57 X10*3/UL (ref 1.2–4.8)
LYMPHOCYTES NFR BLD AUTO: 30 %
MCH RBC QN AUTO: 29.8 PG (ref 26–34)
MCHC RBC AUTO-ENTMCNC: 31.8 G/DL (ref 32–36)
MCV RBC AUTO: 94 FL (ref 80–100)
MONOCYTES # BLD AUTO: 0.69 X10*3/UL (ref 0.1–1)
MONOCYTES NFR BLD AUTO: 8.1 %
NEUTROPHILS # BLD AUTO: 4.92 X10*3/UL (ref 1.2–7.7)
NEUTROPHILS NFR BLD AUTO: 57.3 %
NON HDL CHOLESTEROL: 93 MG/DL (ref 0–149)
NRBC BLD-RTO: 0 /100 WBCS (ref 0–0)
PLATELET # BLD AUTO: 367 X10*3/UL (ref 150–450)
POTASSIUM SERPL-SCNC: 4.5 MMOL/L (ref 3.5–5.3)
PROT SERPL-MCNC: 6.3 G/DL (ref 6.4–8.2)
RBC # BLD AUTO: 5 X10*6/UL (ref 4.5–5.9)
SODIUM SERPL-SCNC: 138 MMOL/L (ref 136–145)
TRIGL SERPL-MCNC: 89 MG/DL (ref 0–149)
TSH SERPL-ACNC: 1.23 MIU/L (ref 0.44–3.98)
VIT B12 SERPL-MCNC: 495 PG/ML (ref 211–911)
VLDL: 18 MG/DL (ref 0–40)
WBC # BLD AUTO: 8.6 X10*3/UL (ref 4.4–11.3)

## 2024-02-12 PROCEDURE — 80053 COMPREHEN METABOLIC PANEL: CPT

## 2024-02-12 PROCEDURE — 83036 HEMOGLOBIN GLYCOSYLATED A1C: CPT

## 2024-02-12 PROCEDURE — 36415 COLL VENOUS BLD VENIPUNCTURE: CPT

## 2024-02-12 PROCEDURE — 85025 COMPLETE CBC W/AUTO DIFF WBC: CPT

## 2024-02-12 PROCEDURE — 82306 VITAMIN D 25 HYDROXY: CPT

## 2024-02-12 PROCEDURE — 80061 LIPID PANEL: CPT

## 2024-02-12 PROCEDURE — 84443 ASSAY THYROID STIM HORMONE: CPT

## 2024-02-12 PROCEDURE — 82607 VITAMIN B-12: CPT

## 2024-02-12 NOTE — TELEPHONE ENCOUNTER
He has been on this medication/dose for several years.  Anytime he tries to cut back he has diarrhea and weight loss.

## 2024-02-12 NOTE — TELEPHONE ENCOUNTER
For the Patient Rx Creon. He is out and needs this filled ASAP this is the response I got back. First it was approved but Apparently the dosage isn't approved. Can you give me another more in depth so I can get this approved for the patient.      Message:   Case Id:[id number];  Status:Cancelled;  Explanation:Other. COST EXCEEDS MAXIMUM. DISPENSING PHARMACY MAY NEED TO CALL [phone] FOR OVERRIDE.;   Payer: [employer] Patient's Payer   [phone]

## 2024-04-01 ENCOUNTER — HOSPITAL ENCOUNTER (OUTPATIENT)
Dept: RADIOLOGY | Facility: HOSPITAL | Age: 64
Discharge: HOME | End: 2024-04-01
Payer: COMMERCIAL

## 2024-04-01 DIAGNOSIS — R91.8 LUNG NODULES: ICD-10-CM

## 2024-04-01 PROCEDURE — 71271 CT THORAX LUNG CANCER SCR C-: CPT

## 2024-04-28 DIAGNOSIS — E16.9 DISORDER OF PANCREATIC INTERNAL SECRETION (HHS-HCC): ICD-10-CM

## 2024-04-29 ENCOUNTER — OFFICE VISIT (OUTPATIENT)
Dept: PULMONOLOGY | Facility: CLINIC | Age: 64
End: 2024-04-29
Payer: COMMERCIAL

## 2024-04-29 VITALS
DIASTOLIC BLOOD PRESSURE: 78 MMHG | HEIGHT: 70 IN | BODY MASS INDEX: 14.86 KG/M2 | OXYGEN SATURATION: 91 % | WEIGHT: 103.8 LBS | SYSTOLIC BLOOD PRESSURE: 127 MMHG | HEART RATE: 73 BPM | TEMPERATURE: 98.6 F

## 2024-04-29 DIAGNOSIS — J43.2 CENTRILOBULAR EMPHYSEMA (MULTI): Primary | ICD-10-CM

## 2024-04-29 DIAGNOSIS — Z87.898 HISTORY OF MULTIPLE PULMONARY NODULES: ICD-10-CM

## 2024-04-29 DIAGNOSIS — R06.09 DYSPNEA ON EXERTION: ICD-10-CM

## 2024-04-29 PROCEDURE — 99214 OFFICE O/P EST MOD 30 MIN: CPT | Performed by: INTERNAL MEDICINE

## 2024-04-29 RX ORDER — PANCRELIPASE 36000; 180000; 114000 [USP'U]/1; [USP'U]/1; [USP'U]/1
2 CAPSULE, DELAYED RELEASE PELLETS ORAL
Qty: 200 CAPSULE | Refills: 5 | Status: SHIPPED | OUTPATIENT
Start: 2024-04-29

## 2024-04-29 NOTE — PROGRESS NOTES
Subjective   Patient ID: Dallin Crawford is a 63 y.o. male who presents for No chief complaint on file..  HPI  Patient was seen today on a 5-month follow-up visit.  Patient reports some increased shortness of breath.  Patient asked in regards to whether he was a candidate to be laid off from work.  We discussed that at this time his oxygen saturation is 91% or better.  He is today.  His current bronchodilator therapy is Breztri 2 inhalations twice daily and albuterol inhaler 2 puffs 4 times daily.  He reports having a mild cough of generally clear sputum.  He denies any hemoptysis or wheezing.  Review of Systems  Patient has not had any problems with fever, chills, rhinitis or sore throat.  He is still smoking half pack per day.  I reviewed with him that in order to improve his breathing condition he would need to quit smoking but he is not interested in that at this time.  I also reviewed with him the results of his low-dose CT screening which was done on 4/1/2024 and did not show any new or enlarging lung nodules.  It also appeared to be an area of mucoid secretions in the left lower lobe.  Patient has severe emphysema.  Objective   Physical Exam  HEENT the patient does have some oral candidiasis noted on the soft palate and a again encouraged him to rinse his mouth and gargle with tap water after each use of the Breztri and suggested that he take that inhaler before his breakfast and dinner.  Pulmonary, diminished breath sounds bilaterally but clear today.  Cardio, heart sounds are regular rate and rhythm.  Extremities, no cyanosis, clubbing or pretibial edema  Assessment/Plan        Impressions:  1.  Severe centrilobular emphysema.  2.  Multiple lung nodules all measuring less than 6 mm and unchanged in number or shape.  3.  Was also noted some coronary artery disease that was seen on the study and was described as mild.  4.  Dyspnea on exertion.  Recommendations:  1.  Low-dose CT scan of the chest in 1 year  from 4/1/2024.  2.  He is to contact us if he has some problems with increased shortness of breath.  3.  Again I encouraged him to discontinue smoking.  4.  Follow-up with the patient in 4 months      This note was transcribed using the Dragon Dictation system.  There may be grammatical, punctuation, or verbiage errors that occur with voice recognition programs.    Diogo Whitfield DO 04/29/24 9:34 AM

## 2024-04-30 ENCOUNTER — OFFICE VISIT (OUTPATIENT)
Dept: PRIMARY CARE | Facility: CLINIC | Age: 64
End: 2024-04-30
Payer: COMMERCIAL

## 2024-04-30 VITALS
DIASTOLIC BLOOD PRESSURE: 80 MMHG | WEIGHT: 104.2 LBS | BODY MASS INDEX: 14.92 KG/M2 | HEART RATE: 66 BPM | SYSTOLIC BLOOD PRESSURE: 112 MMHG | HEIGHT: 70 IN | OXYGEN SATURATION: 86 %

## 2024-04-30 DIAGNOSIS — F17.200 SMOKER: Primary | ICD-10-CM

## 2024-04-30 DIAGNOSIS — K86.1 CHRONIC PANCREATITIS, UNSPECIFIED PANCREATITIS TYPE (MULTI): ICD-10-CM

## 2024-04-30 PROCEDURE — 99214 OFFICE O/P EST MOD 30 MIN: CPT | Performed by: FAMILY MEDICINE

## 2024-04-30 RX ORDER — VARENICLINE TARTRATE 0.5 (11)-1
KIT ORAL
Qty: 53 EACH | Refills: 0 | Status: SHIPPED | OUTPATIENT
Start: 2024-04-30

## 2024-04-30 NOTE — PROGRESS NOTES
Subjective   Dallin Crawford is a 63 y.o. male who presents for No chief complaint on file..  Here stating that he is losing weight.  He is having trouble getting the creon due to insurance.      Is having issues with his oxygen levels dropping.  The more active he is the worse his breathing is.  He does have oxygen at home, uses it when sleeping mostly.  He does have portable oxygen, but he can't use that while working.  He is wondering about taking a leave from work. We discussed taking 2 weeks off and then see how he does.     He is also interested in trying chantix again.              Objective   Visit Vitals  /80 (BP Location: Left arm, Patient Position: Sitting, BP Cuff Size: Adult)   Pulse 66      Physical Exam  Vitals reviewed.   Constitutional:       General: He is not in acute distress.  Cardiovascular:      Rate and Rhythm: Normal rate and regular rhythm.      Heart sounds: No murmur heard.  Pulmonary:      Effort: Pulmonary effort is normal. No respiratory distress.      Breath sounds: Normal breath sounds.   Skin:     General: Skin is warm and dry.   Neurological:      General: No focal deficit present.      Mental Status: He is alert. Mental status is at baseline.         Assessment/Plan   Problem List Items Addressed This Visit       Chronic pancreatitis (Multi)     Other Visit Diagnoses       Smoker    -  Primary    Relevant Medications    varenicline (Chantix Starting Month Box) 0.5 mg (11)- 1 mg (42) tablet               Sona Delong MD

## 2024-04-30 NOTE — LETTER
April 30, 2024     Patient: Dallin Crawford   YOB: 1960   Date of Visit: 4/30/2024       To Whom It May Concern:    Dallin Crawford was seen in my clinic on 4/30/2024 at 11:40 am. Please excuse Dallin for two weeks.      If you have any questions or concerns, please don't hesitate to call.         Sincerely,         Sona Delong MD        CC: No Recipients

## 2024-04-30 NOTE — PROGRESS NOTES
Subjective   Patient ID: Dallin Crawford is a 63 y.o. male who presents for patient keeps losing weight. He has been denied medications for the Creon by his insurance. Patient states he did well when he was on it and was able to gain weight.     HPI     Review of Systems    Objective   There were no vitals taken for this visit.    Physical Exam    Assessment/Plan

## 2024-05-10 ENCOUNTER — OFFICE VISIT (OUTPATIENT)
Dept: PRIMARY CARE | Facility: CLINIC | Age: 64
End: 2024-05-10
Payer: COMMERCIAL

## 2024-05-10 VITALS
DIASTOLIC BLOOD PRESSURE: 64 MMHG | HEIGHT: 70 IN | WEIGHT: 105.3 LBS | BODY MASS INDEX: 15.07 KG/M2 | HEART RATE: 67 BPM | SYSTOLIC BLOOD PRESSURE: 98 MMHG | OXYGEN SATURATION: 97 %

## 2024-05-10 DIAGNOSIS — R06.02 SHORTNESS OF BREATH: ICD-10-CM

## 2024-05-10 DIAGNOSIS — R07.9 CHEST PAIN, UNSPECIFIED TYPE: Primary | ICD-10-CM

## 2024-05-10 DIAGNOSIS — R09.02 HYPOXIA: ICD-10-CM

## 2024-05-10 PROCEDURE — 99214 OFFICE O/P EST MOD 30 MIN: CPT | Performed by: FAMILY MEDICINE

## 2024-05-10 NOTE — PROGRESS NOTES
Subjective   Dallin Crawford is a 63 y.o. male who presents for No chief complaint on file..  Here for follow up shortness of breath, hypoxia.  We have had him off work for a couple of weeks, he states that he feels about the same, still having shortness of breath and low oxygen levels.  He states that he does get chest pain occasionally, typically when he is struggling to breath.  He states that he does not think he will be able to go back to work at this time.      He is taking chantix and finds that is helpful - has cut back on his smoking.      We will have him off work for 3 months while we pursue a cardiac work up and further evaluate.              Objective   Visit Vitals  BP 98/64 (BP Location: Left arm, Patient Position: Sitting, BP Cuff Size: Adult)   Pulse 67      Physical Exam  Vitals reviewed.   Constitutional:       General: He is not in acute distress.  Cardiovascular:      Rate and Rhythm: Normal rate and regular rhythm.      Heart sounds: No murmur heard.  Pulmonary:      Effort: Pulmonary effort is normal. No respiratory distress.      Breath sounds: Normal breath sounds.   Skin:     General: Skin is warm and dry.   Neurological:      General: No focal deficit present.      Mental Status: He is alert. Mental status is at baseline.         Assessment/Plan   Problem List Items Addressed This Visit    None  Visit Diagnoses       Chest pain, unspecified type    -  Primary    Relevant Orders    Referral to Cardiology    Hypoxia        Relevant Orders    Referral to Cardiology    Shortness of breath        Relevant Orders    Referral to Cardiology               Sona Delong MD

## 2024-05-10 NOTE — LETTER
May 10, 2024     Patient: Dallin Crawford   YOB: 1960   Date of Visit: 5/10/2024       To Whom It May Concern:    Dallin Crawford was seen in my clinic on 5/10/2024 at 11:00 am. Off work until 8/10/24.    If you have any questions or concerns, please don't hesitate to call.         Sincerely,         Sona Delong MD        CC: No Recipients

## 2024-05-10 NOTE — PATIENT INSTRUCTIONS
Will refer to cardiology for cardiac evaluation.  Will have him off work for 3 months.  Follow up here in 3 months, sooner if needed.

## 2024-05-10 NOTE — PROGRESS NOTES
Subjective   Patient ID: Dallin Crawford is a 63 y.o. male who presents for follow up appointment for re-evaluation. Patient states it is about the same. SOB and O2 drops to about 89/90    HPI     Review of Systems    Objective   There were no vitals taken for this visit.    Physical Exam    Assessment/Plan

## 2024-05-16 ENCOUNTER — LAB (OUTPATIENT)
Dept: LAB | Facility: LAB | Age: 64
End: 2024-05-16
Payer: COMMERCIAL

## 2024-05-16 DIAGNOSIS — D69.6 THROMBOCYTOPENIA (CMS-HCC): ICD-10-CM

## 2024-05-16 LAB
ALBUMIN SERPL BCP-MCNC: 4.1 G/DL (ref 3.4–5)
ALP SERPL-CCNC: 115 U/L (ref 33–136)
ALT SERPL W P-5'-P-CCNC: 20 U/L (ref 10–52)
ANION GAP SERPL CALC-SCNC: 11 MMOL/L (ref 10–20)
AST SERPL W P-5'-P-CCNC: 20 U/L (ref 9–39)
BASOPHILS # BLD AUTO: 0.11 X10*3/UL (ref 0–0.1)
BASOPHILS NFR BLD AUTO: 1.8 %
BILIRUB SERPL-MCNC: 0.5 MG/DL (ref 0–1.2)
BUN SERPL-MCNC: 6 MG/DL (ref 6–23)
CALCIUM SERPL-MCNC: 8.9 MG/DL (ref 8.6–10.6)
CHLORIDE SERPL-SCNC: 96 MMOL/L (ref 98–107)
CO2 SERPL-SCNC: 32 MMOL/L (ref 21–32)
CREAT SERPL-MCNC: 0.69 MG/DL (ref 0.5–1.3)
EGFRCR SERPLBLD CKD-EPI 2021: >90 ML/MIN/1.73M*2
EOSINOPHIL # BLD AUTO: 0.14 X10*3/UL (ref 0–0.7)
EOSINOPHIL NFR BLD AUTO: 2.3 %
ERYTHROCYTE [DISTWIDTH] IN BLOOD BY AUTOMATED COUNT: 12.9 % (ref 11.5–14.5)
GLUCOSE SERPL-MCNC: 124 MG/DL (ref 74–99)
HCT VFR BLD AUTO: 45 % (ref 41–52)
HGB BLD-MCNC: 14.9 G/DL (ref 13.5–17.5)
IMM GRANULOCYTES # BLD AUTO: 0.04 X10*3/UL (ref 0–0.7)
IMM GRANULOCYTES NFR BLD AUTO: 0.6 % (ref 0–0.9)
LYMPHOCYTES # BLD AUTO: 1.63 X10*3/UL (ref 1.2–4.8)
LYMPHOCYTES NFR BLD AUTO: 26.5 %
MCH RBC QN AUTO: 30.2 PG (ref 26–34)
MCHC RBC AUTO-ENTMCNC: 33.1 G/DL (ref 32–36)
MCV RBC AUTO: 91 FL (ref 80–100)
MONOCYTES # BLD AUTO: 0.61 X10*3/UL (ref 0.1–1)
MONOCYTES NFR BLD AUTO: 9.9 %
NEUTROPHILS # BLD AUTO: 3.63 X10*3/UL (ref 1.2–7.7)
NEUTROPHILS NFR BLD AUTO: 58.9 %
NRBC BLD-RTO: 0 /100 WBCS (ref 0–0)
PLATELET # BLD AUTO: ABNORMAL 10*3/UL
POTASSIUM SERPL-SCNC: 3.3 MMOL/L (ref 3.5–5.3)
PROT SERPL-MCNC: 6.6 G/DL (ref 6.4–8.2)
RBC # BLD AUTO: 4.93 X10*6/UL (ref 4.5–5.9)
SODIUM SERPL-SCNC: 136 MMOL/L (ref 136–145)
WBC # BLD AUTO: 6.2 X10*3/UL (ref 4.4–11.3)

## 2024-05-16 PROCEDURE — 85025 COMPLETE CBC W/AUTO DIFF WBC: CPT

## 2024-05-16 PROCEDURE — 80053 COMPREHEN METABOLIC PANEL: CPT

## 2024-05-16 PROCEDURE — 36415 COLL VENOUS BLD VENIPUNCTURE: CPT

## 2024-05-20 ENCOUNTER — OFFICE VISIT (OUTPATIENT)
Dept: CARDIOLOGY | Facility: CLINIC | Age: 64
End: 2024-05-20
Payer: COMMERCIAL

## 2024-05-20 ENCOUNTER — OFFICE VISIT (OUTPATIENT)
Dept: HEMATOLOGY/ONCOLOGY | Facility: CLINIC | Age: 64
End: 2024-05-20
Payer: COMMERCIAL

## 2024-05-20 VITALS
BODY MASS INDEX: 15.03 KG/M2 | DIASTOLIC BLOOD PRESSURE: 74 MMHG | OXYGEN SATURATION: 98 % | SYSTOLIC BLOOD PRESSURE: 118 MMHG | HEIGHT: 70 IN | WEIGHT: 105 LBS | HEART RATE: 81 BPM

## 2024-05-20 VITALS
TEMPERATURE: 97 F | SYSTOLIC BLOOD PRESSURE: 130 MMHG | HEART RATE: 62 BPM | DIASTOLIC BLOOD PRESSURE: 63 MMHG | WEIGHT: 104.4 LBS | RESPIRATION RATE: 16 BRPM | OXYGEN SATURATION: 97 % | BODY MASS INDEX: 14.98 KG/M2

## 2024-05-20 DIAGNOSIS — R07.89 ATYPICAL CHEST PAIN: Primary | ICD-10-CM

## 2024-05-20 DIAGNOSIS — E87.6 HYPOKALEMIA: Primary | ICD-10-CM

## 2024-05-20 DIAGNOSIS — D69.6 THROMBOCYTOPENIA (CMS-HCC): ICD-10-CM

## 2024-05-20 DIAGNOSIS — Z71.6 ENCOUNTER FOR SMOKING CESSATION COUNSELING: ICD-10-CM

## 2024-05-20 DIAGNOSIS — R07.9 CHEST PAIN, UNSPECIFIED TYPE: ICD-10-CM

## 2024-05-20 DIAGNOSIS — R09.02 HYPOXIA: ICD-10-CM

## 2024-05-20 DIAGNOSIS — R06.02 SHORTNESS OF BREATH: ICD-10-CM

## 2024-05-20 PROCEDURE — 99204 OFFICE O/P NEW MOD 45 MIN: CPT

## 2024-05-20 PROCEDURE — 99213 OFFICE O/P EST LOW 20 MIN: CPT

## 2024-05-20 PROCEDURE — 93000 ELECTROCARDIOGRAM COMPLETE: CPT

## 2024-05-20 PROCEDURE — 99406 BEHAV CHNG SMOKING 3-10 MIN: CPT

## 2024-05-20 RX ORDER — POTASSIUM CHLORIDE 750 MG/1
10 TABLET, FILM COATED, EXTENDED RELEASE ORAL DAILY
Qty: 1 TABLET | Refills: 0 | Status: SHIPPED | OUTPATIENT
Start: 2024-05-20 | End: 2024-05-30

## 2024-05-20 ASSESSMENT — COLUMBIA-SUICIDE SEVERITY RATING SCALE - C-SSRS
6. HAVE YOU EVER DONE ANYTHING, STARTED TO DO ANYTHING, OR PREPARED TO DO ANYTHING TO END YOUR LIFE?: NO
2. HAVE YOU ACTUALLY HAD ANY THOUGHTS OF KILLING YOURSELF?: NO
1. IN THE PAST MONTH, HAVE YOU WISHED YOU WERE DEAD OR WISHED YOU COULD GO TO SLEEP AND NOT WAKE UP?: NO

## 2024-05-20 ASSESSMENT — PAIN SCALES - GENERAL: PAINLEVEL: 0-NO PAIN

## 2024-05-20 NOTE — PROGRESS NOTES
[facility]  Cardiology Clinic Office Note    Active Issues  This is a 63 y.o. male current half to a full pack a pack a day smoker with a history of severe emphysema and lung nodules who presents to the clinic referred by Dr. Delong for chest pain associated with shortness of breath.     November 2019 CT coronary calcium score of 27.    Patient endorses shortness of breath with any level of activity.  He states that when he overexerts himself and gets severely short of breath he then experiences a dull ache on the left side of his chest.  The pain only lasts a few seconds and resolves after he catches his breath.  He experiences once or twice a month for the past few months.    Past Medical History  Past Medical History:   Diagnosis Date    Acquired partial absence of pancreas 11/12/2019    History of partial pancreatectomy    Nicotine dependence, cigarettes, uncomplicated     Cigarette nicotine dependence without complication    Other specified abnormal findings of blood chemistry 11/18/2019    Low TSH level    Personal history of other specified conditions 07/20/2020    History of weight loss       Past Surgical History  Past Surgical History:   Procedure Laterality Date    OTHER SURGICAL HISTORY  11/12/2019    Colonoscopy    OTHER SURGICAL HISTORY  11/12/2019    Cholecystectomy    OTHER SURGICAL HISTORY  11/18/2019    Whipple procedure       Medications  Current Outpatient Medications on File Prior to Visit   Medication Sig Dispense Refill    albuterol 90 mcg/actuation inhaler Inhale 2 puffs every 4 hours if needed for wheezing.      Breztri Aerosphere 160-9-4.8 mcg/actuation HFA aerosol inhaler Inhale every 12 hours.      Creon 36,000-114,000- 180,000 unit capsule,delayed release(DR/EC) capsule TAKE 2 CAPSULES BY MOUTH 3 TIMES A DAY WITH MEALS 200 capsule 5    potassium chloride CR (Klor-Con) 10 mEq ER tablet Take 1 tablet (10 mEq) by mouth once daily for 10 days. Do not crush, chew, or  split. 1 tablet 0    varenicline (Chantix Starting Month Box) 0.5 mg (11)- 1 mg (42) tablet Use as directed 53 each 0     No current facility-administered medications on file prior to visit.       Allergies  No Known Allergies    Social History  Social History     Tobacco Use    Smoking status: Every Day     Current packs/day: 0.50     Types: Cigarettes    Smokeless tobacco: Never   Vaping Use    Vaping status: Never Used   Substance Use Topics    Alcohol use: Not Currently    Drug use: Never       Family History  Family History   Problem Relation Name Age of Onset    Cancer Father      Melanoma Father      Breast cancer Sister         VITALS  Vitals:    05/20/24 1505   BP: 118/74   Pulse: 81   SpO2: 98%       Weight  Vitals:    05/20/24 1505   Weight: 47.6 kg (105 lb)       PHYSICAL EXAM  Physical Exam  Vitals and nursing note reviewed.   Constitutional:       General: He is not in acute distress.  HENT:      Head: Normocephalic and atraumatic.      Mouth/Throat:      Mouth: Mucous membranes are moist.      Pharynx: Oropharynx is clear.   Eyes:      General: No scleral icterus.     Pupils: Pupils are equal, round, and reactive to light.   Cardiovascular:      Rate and Rhythm: Normal rate and regular rhythm.      Chest Wall: PMI is not displaced.      Pulses: Normal pulses.      Heart sounds: Normal heart sounds. No murmur heard.     No friction rub.   Pulmonary:      Effort: Pulmonary effort is normal.      Breath sounds: Decreased air movement present.   Abdominal:      General: Bowel sounds are normal. There is no distension.      Palpations: Abdomen is soft.      Tenderness: There is no abdominal tenderness.   Musculoskeletal:         General: No swelling. Normal range of motion.      Cervical back: Normal range of motion and neck supple.      Right lower leg: No edema.      Left lower leg: No edema.   Skin:     General: Skin is warm and dry.      Capillary Refill: Capillary refill takes less than 2 seconds.       Findings: No rash.   Neurological:      General: No focal deficit present.      Mental Status: He is alert.   Psychiatric:         Mood and Affect: Mood normal.         Behavior: Behavior normal.         Cardiovascular Imaging/Procedures/Labs  Lab Results   Component Value Date    HGB 14.9 05/16/2024    HGB 14.9 02/12/2024    HGB 15.1 12/11/2023    PLT  05/16/2024      Comment:      PLATELET CLUMPS PRECLUDE QUANTITATION. PLATELET ESTIMATE APPEARS ADEQUATE    WBC 6.2 05/16/2024     05/16/2024    K 3.3 (L) 05/16/2024    CREATININE 0.69 05/16/2024    CREATININE 0.65 02/12/2024    CREATININE 0.73 12/11/2023    BUN 6 05/16/2024    CALCIUM 8.9 05/16/2024    TROPHS 3 05/02/2022    LDLF 94 08/07/2023       No echocardiogram results found for the past 12 months    CT lung screening low dose  Narrative: Interpreted By:  Andrea Kelley,   STUDY:  CT LUNG SCREENING LOW DOSE;  4/1/2024 8:15 am      INDICATION:  Signs/Symptoms:nodules.      COMPARISON:  CT dated 03/27/2023      ACCESSION NUMBER(S):  [id number]      ORDERING CLINICIAN:  AMY LEPE      TECHNIQUE:  Helical data acquisition of the chest was obtained without IV  contrast material.  Images were reformatted in axial, coronal, and  sagittal planes.      FINDINGS:  LUNGS AND AIRWAYS:  The trachea and central airways are patent. No endobronchial lesion  is seen.Mucus and secretion in the trachea.      There is severe bilateral upper lung predominant centrilobular and  paraseptal emphysema.There is no focal consolidation, pleural  effusion, or pneumothorax.      Stable 4 mm nodular density along the right minor fissure, image  197/361, likely a lymph node.  5 mm right lower lobe pleural-based nodule, image 296/361, stable  4 mm right lower lobe nodule, image 302/361, unchanged.  3 mm left lower lobe nodule, image 147/361, stable.  Additional scattered 2-3 mm nodules are annotated on PACs.      An area of tree-in-bud nodularity in the left lower lobe,  likely  inflammatory/in etiology      MEDIASTINUM AND MARIANA, LOWER NECK AND AXILLA:  The visualized thyroid gland is within normal limits.  No evidence of thoracic lymphadenopathy by CT criteria.  Esophagus appears within normal limits as seen.      HEART AND VESSELS:  The thoracic aorta normal in course and caliber.There is mild  scattered calcified atherosclerosis present. Main pulmonary artery  and its branches are normal in caliber. Mild coronary artery  calcifications are seen. Please note,the study is not optimized for  evaluation of coronary arteries. The cardiac chambers are not  enlarged. There is no pericardial effusion seen.      UPPER ABDOMEN:  There is unchanged pneumobilia.  Nonobstructing calcified stones in the kidneys measuring to 8 mm.              CHEST WALL AND OSSEOUS STRUCTURES:  Chest wall is within normal limits.  No acute osseous pathology.There are no suspicious osseous lesions.      Impression: 1.  Few small bilateral noncalcified pulmonary nodules measuring up  to 5 mm, stable. Continued screening with low-dose noncontrast chest  CT in 12 months (from current date) is recommended.  2. Severe upper lung predominant emphysema. A subtle area of  tree-in-bud nodularity in left lower lobe, unchanged and likely  inflammatory/infectious in etiology.  3.  Mild coronary artery calcification. Estimated coronary artery  calcium score 51. Correlate with coronary artery disease risk factors.  4. Multiple nonobstructing stones in bilateral kidneys measuring up  to 8 mm.          LUNG RADS CATEGORY:  Lung Rad: Lung-RADS 2 (Benign Appearance or Indolent Behavior)      Recommendation: Continue annual screening with Low Dose Chest CT in  12 months, recommended as per American College of Radiology  Guidelines Lung-RADS Version 2022.          MACRO:  None      Signed by: Andrea Almaguer 4/1/2024 9:22 PM  Dictation workstation:   [id number]        Assessment and Plan    Chest pain, atypical  -EKG in the  office today shows normal sinus rhythm with no ischemic changes.   -November 2019 CT coronary calcium score of 27.  -He does not have high cholesterol.   -I do not believe his chest pain is cardiac in nature or anginal.  He will experiences pain when he is hypoxic and he overexerts himself and resolves when his breathing improves.  His appearance suggests severe obstructive lung disease follows with pulmonology. On examination he is sitting in tripod position with barrel chested and upon auscultation I hear very little air movement.  He was not short of breath at rest.   -I strongly suggested that he stop smoking completely and had a thorough discussion with the patient lasting at least 3 to 5 minutes addressing the health hazard risks for smoking, including but not limited to heart attack, stroke and cancer. The patient states to understand the risks and is trying to quit.  He is on Chantix.    RTC:  As needed    If your symptoms worsen or progress please go directory to your nearest emergency department for evaluation.     Thank you for this interesting clinical case and allowing me to participate in the care of this patient. Please reach me out if you have any questions or if you need any clarifications regarding this patient's care.    **Disclaimer: This note was dictated by speech recognition, and every effort has been made to prevent any error in transcription, however minor errors may be present**  ___________________________________________________  Edwin Love, MSN, CNP, ACNPC, CCRN  Division of Cardiovascular Medicine  [facility]  [facility]

## 2024-05-20 NOTE — PROGRESS NOTES
Patient ID: Dallin Crawford is a 63 y.o. male.    Subjective    HPI  HPI  Chief Complaint: Thrombocytopenia   Interval History:    Initial consult: 8/28/23  Reason: Thrombocyopenia  Referred: Sona Delong        Patient is a 64 yo male with a PMH of Emphysema Pancreas Insufficiency, DARIAN, Lung Nodules, Hypoxia and was referred to benign hematology for consultation of thrombocytopenia.      According to our records since November 2020 patient platelets have ranged between 26k-216k.  During this time frame the patients platelets have been reported low four  separate times as indicated, 137k as of May 2022, 138k as of July 2022 and most recently  28k on 8/7/23 and 26k on 8/14/23.      Patient has a history of whipple procedure in 2015 due to having precancerous pancreatic cysts. He remains on creon for pancreatic insuffiencey.  Prior to the surgery he had a history of consuming large amounts of alcohol.      Today, patient presents for initial consultation. Chronic fatigue, sleeps an average of 5 hours daily due to work schedule. Appetite varies-but is mostly decent, sometimes gets full fast, not a new finding since whipple surgery. Patient denies any spontaneous  or major bleeding events, but does bruise easier, especially in the last several months. Weight loss of 4-5lbs over the past month. Occasional Diarrhea if he forgets to take creon or food doesn't agree with him. No urinary issues. No hematochezia, melena  or hematuria. SOB that worsens with activity, occasional productive cough with green to yellow sputum. Denies fevers, chills or night sweats. Denies fatigue, chills, sob, cp, n/v/d, n/t. No PICA. Denies any abnormal bleeding or bruising. No recurrent  infections or lymphadenopathy. No joint/body pain. No known blood disorders in family. Has had surgery in past w/o issue. Never had blood/blood products. Denies NSAID use.      Colonoscopy approximately 2years ago- polyps were found- denies anything  malicious       Interval History 5/20/24     Energy is decent, fatigued due to long days at work  Appetite is good, eating and drinking well    Drinking couple 8 oz (2-3) MT dew, Gatorade and water   ROONEY, increased with activity   Seen by pulmonology- no treatment at this time   1/2-ppd daily      Weight is stable   Eating and drinking well    No GI issues   No urinary issues  Occasional numbness or tingling in hands or feet   Chest pain- with ROONEY  No fevers, night sweats     Objective    BSA: There is no height or weight on file to calculate BSA.  There were no vitals taken for this visit.     Physical Exam  Vitals and nursing note reviewed.   Constitutional:       Appearance: Normal appearance.   HENT:      Head: Normocephalic and atraumatic.      Mouth/Throat:      Pharynx: Oropharynx is clear.   Eyes:      General: No scleral icterus.     Extraocular Movements: Extraocular movements intact.      Conjunctiva/sclera: Conjunctivae normal.   Cardiovascular:      Rate and Rhythm: Normal rate and regular rhythm.      Pulses: Normal pulses.      Heart sounds: Normal heart sounds.   Pulmonary:      Effort: Pulmonary effort is normal.      Breath sounds: Normal breath sounds.   Abdominal:      General: There is no distension.      Palpations: Abdomen is soft.      Tenderness: There is no abdominal tenderness.   Musculoskeletal:         General: Normal range of motion.      Cervical back: Normal range of motion.   Skin:     General: Skin is warm and dry.   Neurological:      General: No focal deficit present.      Mental Status: He is alert and oriented to person, place, and time.   Psychiatric:         Mood and Affect: Mood normal.         Behavior: Behavior normal.         Thought Content: Thought content normal.         Judgment: Judgment normal.         Performance Status:  Symptomatic; fully ambulatory      Assessment/Plan      Assessment:    HEME-ONC ASSESSMENT AND PLAN     PROBLEMS:  1. Mild thrombocytopenia  -  5-2-22 Platelets 137k. Hgb 15.0, WBC 7.8  - 7-19-22 Platelets 138k, Hgb 15.2, WBC 6.0  -8-7-23 Platelets 28k, Hgb 15.1, WBC 6.1  -8-14-23 Platelets 26k, Hgb 14.6, WBC 5.6  -12-11-23 Platelets 235k, Hgb 15.1, WBC 8.0  -12-11-23 Fe 83, Fe sat 23%,   -5-20-24 Per labs: Platelet Clumps Preclude Quantitation. Platelet Estimate Appears Adequate       2. Pancreatic Insufficiency-  -2015 Whipple procedure due to benign pancreatic cysts  - takes Creon daily prior to meals     3. Lung Nodules  - 3-27-23- CT scan noted emphysematous changes, no suspicious parenchymal lung nodules  - managed by Pulmonology      4. Emphysema  - 3-27-23- CT scan reported sever emphysematous changes with adherent mucus  - ppd smoker  - managed by pulmonology       5. Hypokalemic:  Potassium- 3.3   Start oral Potassium 10meq for 10 days    CHUCKY COMMENTS:   -Patient is noted to have a mild thrombocytopenia four times since 2022.  He has remained asymptomatic without any bleeding competitions.  Patient has not had any recent exposure to heparin.  I have a low suspicion for thrombotic microangiopathy but we'll  check a peripheral smear, LDH and a haptoglobin to assess for any hemolysis though it is reassuring that his hemoglobin is normal.  Nutrition deficiencies are also in the differential and will check iron, folate and B12 today.  Underlying bone marrow  condition is less likely given he does not have any other cytopenias.      Immune thrombocytopenia is also in the differential however there are no obvious precipitating factors such as recent infection or drugs.  Given his age it is not likely to have a new autoimmune condition that would precipitate this.     - Discussed possible etiologies including multifactorial, nutritional deficiencies, anemia of chronic disease, malabsorption, hemopathy, medications, bleeding, malignancy, etc. Will start hematological workup today.    5/20/24- Patient in office today with family. WBC remains corrected and  stable at 6.2. Patient is doing well. Appetite is good, remains unable to put on weight. Pt is unable to get his Creon refilled, insurance denial. Off work for an extended period of time due to increased SOB. Scheduled for cardiology consult later today. Denies any bleeding or bruising issues.  Denies any new clinical concerns or issues. Remainder of CBC is stable.  Continue taking B12 as same.  Patient is mildly hypokalemic- with a potassium level of 3.3. Ordered 10 days oral Potassium 10 meq daily for 10 days.   Will continue to serial monitor patient CBC.        PLANS:  - Labs in after completion of potassium CBC w/diff, CMP  - repeat CBC, CMP, B12, Ferritin, Iron prior to follow-up  - Follow-up in 6 month           NEGRO Ashley-CNP

## 2024-05-20 NOTE — PROGRESS NOTES
Instructed to get labs at the hosp prior to next visit  Rtc for CNP visit 11/25 9am  Reviewed AVS with patient- patient verbalizes understanding

## 2024-05-28 ENCOUNTER — TELEPHONE (OUTPATIENT)
Dept: PRIMARY CARE | Facility: CLINIC | Age: 64
End: 2024-05-28

## 2024-05-28 ENCOUNTER — APPOINTMENT (OUTPATIENT)
Dept: PRIMARY CARE | Facility: CLINIC | Age: 64
End: 2024-05-28
Payer: COMMERCIAL

## 2024-05-28 NOTE — TELEPHONE ENCOUNTER
STD paperwork was successfully faxed to company per patient request.    Patient made aware and copies are ready for patient

## 2024-05-29 DIAGNOSIS — J44.9 CHRONIC OBSTRUCTIVE PULMONARY DISEASE, UNSPECIFIED COPD TYPE (MULTI): Primary | ICD-10-CM

## 2024-05-29 RX ORDER — ALBUTEROL SULFATE 90 UG/1
2 AEROSOL, METERED RESPIRATORY (INHALATION) EVERY 4 HOURS PRN
Qty: 18 G | Refills: 3 | Status: SHIPPED | OUTPATIENT
Start: 2024-05-29 | End: 2024-09-26

## 2024-05-29 RX ORDER — BUDESONIDE, GLYCOPYRROLATE, AND FORMOTEROL FUMARATE 160; 9; 4.8 UG/1; UG/1; UG/1
2 AEROSOL, METERED RESPIRATORY (INHALATION) EVERY 12 HOURS
Qty: 10.7 G | Refills: 3 | Status: SHIPPED | OUTPATIENT
Start: 2024-05-29 | End: 2024-09-26

## 2024-07-03 DIAGNOSIS — F17.200 SMOKER: ICD-10-CM

## 2024-07-03 RX ORDER — VARENICLINE TARTRATE 0.5 (11)-1
KIT ORAL
Qty: 53 EACH | Refills: 0 | OUTPATIENT
Start: 2024-07-03

## 2024-07-03 RX ORDER — VARENICLINE TARTRATE 1 MG/1
1 TABLET, FILM COATED ORAL 2 TIMES DAILY
Qty: 60 TABLET | Refills: 5 | Status: SHIPPED | OUTPATIENT
Start: 2024-07-03

## 2024-08-05 ENCOUNTER — APPOINTMENT (OUTPATIENT)
Dept: PRIMARY CARE | Facility: CLINIC | Age: 64
End: 2024-08-05
Payer: COMMERCIAL

## 2024-08-05 VITALS
SYSTOLIC BLOOD PRESSURE: 122 MMHG | HEART RATE: 90 BPM | HEIGHT: 70 IN | BODY MASS INDEX: 15.28 KG/M2 | DIASTOLIC BLOOD PRESSURE: 82 MMHG | OXYGEN SATURATION: 91 % | WEIGHT: 106.7 LBS

## 2024-08-05 DIAGNOSIS — E16.9 DISORDER OF PANCREATIC INTERNAL SECRETION (HHS-HCC): ICD-10-CM

## 2024-08-05 DIAGNOSIS — E55.9 VITAMIN D DEFICIENCY: ICD-10-CM

## 2024-08-05 DIAGNOSIS — J43.2 CENTRILOBULAR EMPHYSEMA (MULTI): ICD-10-CM

## 2024-08-05 DIAGNOSIS — D69.6 THROMBOCYTOPENIA (CMS-HCC): ICD-10-CM

## 2024-08-05 DIAGNOSIS — R73.09 ELEVATED GLUCOSE LEVEL: ICD-10-CM

## 2024-08-05 RX ORDER — PANCRELIPASE 36000; 180000; 114000 [USP'U]/1; [USP'U]/1; [USP'U]/1
2 CAPSULE, DELAYED RELEASE PELLETS ORAL
Qty: 540 CAPSULE | Refills: 3 | Status: SHIPPED | OUTPATIENT
Start: 2024-08-05

## 2024-08-05 NOTE — PROGRESS NOTES
Subjective   Dallin Crawford is a 64 y.o. male who presents for No chief complaint on file..  Here for routine f/u pancreatic insufficiency, COPD (Dr Whitfield).  He continues to have difficulty getting his creon and is struggling to maintain weight.  He is having more difficulty breathing.  He states that he does not believe he can go back to work.  He is using oxygen at night.              Objective   Visit Vitals  /82 (BP Location: Left arm, Patient Position: Sitting, BP Cuff Size: Adult)   Pulse 90      Physical Exam  Vitals reviewed.   Constitutional:       General: He is not in acute distress.     Comments: Cachectic appearing.       Cardiovascular:      Rate and Rhythm: Normal rate and regular rhythm.      Heart sounds: No murmur heard.  Pulmonary:      Effort: Pulmonary effort is normal. No respiratory distress.      Breath sounds: Normal breath sounds.      Comments: Occasional wheeze.  Skin:     General: Skin is warm and dry.   Neurological:      General: No focal deficit present.      Mental Status: He is alert. Mental status is at baseline.         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

## 2024-08-05 NOTE — LETTER
August 5, 2024     Patient: Dallin Crawford   YOB: 1960   Date of Visit: 8/5/2024       To Whom It May Concern:    Dallin Crawford was seen in my clinic on 8/5/2024 at 8:00 am. Off work for 3 months.      If you have any questions or concerns, please don't hesitate to call.         Sincerely,         Sona Delong MD        CC: No Recipients

## 2024-08-05 NOTE — PATIENT INSTRUCTIONS
Will refer for another opinion on his COPD, Continue current medications at this time.  Will continue off work for 3 more months at this time and get further evaluation by pulmonary.

## 2024-08-05 NOTE — PROGRESS NOTES
Subjective   Patient ID: Dallin Crawford is a 64 y.o. male who presents for 6 month check up. Patient continues to loose weight and is unable to get his Creon. He also states his SOB has gotten worse.     HPI     Review of Systems    Objective   There were no vitals taken for this visit.    Physical Exam    Assessment/Plan

## 2024-08-26 ENCOUNTER — TELEPHONE (OUTPATIENT)
Age: 64
End: 2024-08-26
Payer: COMMERCIAL

## 2024-08-26 NOTE — TELEPHONE ENCOUNTER
Patient only has 2 pills left. Originally he had 10 when we got the PA paper work. Can you work on that today so we can get it approved?     Thank you

## 2024-09-03 DIAGNOSIS — E16.9 DISORDER OF PANCREATIC INTERNAL SECRETION (HHS-HCC): ICD-10-CM

## 2024-09-03 RX ORDER — PANCRELIPASE 36000; 180000; 114000 [USP'U]/1; [USP'U]/1; [USP'U]/1
2 CAPSULE, DELAYED RELEASE PELLETS ORAL
Qty: 540 CAPSULE | Refills: 3 | Status: SHIPPED | OUTPATIENT
Start: 2024-09-03

## 2024-09-04 DIAGNOSIS — E16.9 DISORDER OF PANCREATIC INTERNAL SECRETION (HHS-HCC): ICD-10-CM

## 2024-09-04 RX ORDER — PANCRELIPASE 36000; 180000; 114000 [USP'U]/1; [USP'U]/1; [USP'U]/1
2 CAPSULE, DELAYED RELEASE PELLETS ORAL
Qty: 540 CAPSULE | Refills: 3 | OUTPATIENT
Start: 2024-09-04

## 2024-09-09 ENCOUNTER — APPOINTMENT (OUTPATIENT)
Dept: PULMONOLOGY | Facility: CLINIC | Age: 64
End: 2024-09-09
Payer: COMMERCIAL

## 2024-09-16 ENCOUNTER — APPOINTMENT (OUTPATIENT)
Dept: PULMONOLOGY | Facility: CLINIC | Age: 64
End: 2024-09-16
Payer: COMMERCIAL

## 2024-09-16 VITALS
DIASTOLIC BLOOD PRESSURE: 63 MMHG | HEIGHT: 70 IN | BODY MASS INDEX: 15.17 KG/M2 | WEIGHT: 106 LBS | HEART RATE: 85 BPM | OXYGEN SATURATION: 91 % | TEMPERATURE: 98 F | SYSTOLIC BLOOD PRESSURE: 127 MMHG

## 2024-09-16 DIAGNOSIS — R06.09 DYSPNEA ON EXERTION: ICD-10-CM

## 2024-09-16 DIAGNOSIS — J43.2 CENTRILOBULAR EMPHYSEMA (MULTI): Primary | ICD-10-CM

## 2024-09-16 DIAGNOSIS — R91.8 LUNG NODULES: ICD-10-CM

## 2024-09-16 PROCEDURE — 3008F BODY MASS INDEX DOCD: CPT | Performed by: INTERNAL MEDICINE

## 2024-09-16 PROCEDURE — 99214 OFFICE O/P EST MOD 30 MIN: CPT | Performed by: INTERNAL MEDICINE

## 2024-09-16 NOTE — PROGRESS NOTES
Subjective   Patient ID: Dallin Crawford is a 64 y.o. male who presents for No chief complaint on file..  HPI  Patient was seen today in the office on a 4-month follow-up visit.  Patient has severe centrilobular emphysema.  He is currently on Breztri at 2 and elations twice daily and albuterol inhaler 2 puffs twice daily to 4 times daily.  Does have a history of some mild cough with clear sputum production.  I reviewed with him the results of his most recent pulmonary function study from 4/12/2021 which does show that he has severe obstructive disease there was a severe reduction in gas transfer and these results are most consistent emphysema.  Patient also had a elevation of his of his FeNO which indicates inflammation in the airways and most likely also due to his continued smoking history.  He is currently smoking about 1/2 pack/day.  I also discussed with him results of his low-dose CT scan which was done in April of this year and indicated all nodules were less than or equal to 5 mm in size.  I also discussed with the patient doing a repeat pulmonary function study, but he is not interested in that at this time.  Review of Systems  Patient denies having any fever, chills, rhinitis or sore throat.  Objective   Physical Exam  Oxygen saturation was 91% on room air.  HEENT, there is no inflammation on examination of his oropharynx.  He does admit to rinsing his mouth after using the Breztri inhaler.  Pulmonary, lungs were clear to auscultation bilaterally but diminished breath sounds.  Cardio, heart sounds are regular rate and rhythm.  Extremities, no pretibial edema, cyanosis or clubbing.  Patient however does admit to having some mild edema throughout the day.  Assessment/Plan        Impressions:  1.  Severe centrilobular emphysema.  2.  Multiple lung nodules with all the nodules being less than or equal to 5 mm.  3.  Dyspnea on exertion.  Recommendations:  1.  Low-dose CT scan of the chest in April 2025.  2.   I strongly encouraged the patient to discontinue smoking.  3.  Have asked him to contact our office if he develops any increased shortness of breath.      This note was transcribed using the Dragon Dictation system.  There may be grammatical, punctuation, or verbiage errors that occur with voice recognition programs.    Diogo Whitfield DO 09/16/24 11:55 AM

## 2024-09-29 ENCOUNTER — HOSPITAL ENCOUNTER (INPATIENT)
Facility: HOSPITAL | Age: 64
End: 2024-09-29
Attending: EMERGENCY MEDICINE | Admitting: INTERNAL MEDICINE
Payer: COMMERCIAL

## 2024-09-29 ENCOUNTER — APPOINTMENT (OUTPATIENT)
Dept: RADIOLOGY | Facility: HOSPITAL | Age: 64
End: 2024-09-29
Payer: COMMERCIAL

## 2024-09-29 VITALS
SYSTOLIC BLOOD PRESSURE: 128 MMHG | WEIGHT: 106 LBS | HEIGHT: 69 IN | TEMPERATURE: 99.9 F | OXYGEN SATURATION: 94 % | HEART RATE: 110 BPM | RESPIRATION RATE: 20 BRPM | BODY MASS INDEX: 15.7 KG/M2 | DIASTOLIC BLOOD PRESSURE: 80 MMHG

## 2024-09-29 DIAGNOSIS — J18.9 PNEUMONIA OF BOTH LOWER LOBES DUE TO INFECTIOUS ORGANISM: Primary | ICD-10-CM

## 2024-09-29 DIAGNOSIS — J44.9 CHRONIC OBSTRUCTIVE PULMONARY DISEASE, UNSPECIFIED COPD TYPE (MULTI): ICD-10-CM

## 2024-09-29 DIAGNOSIS — J44.1 COPD EXACERBATION (MULTI): ICD-10-CM

## 2024-09-29 DIAGNOSIS — R53.1 GENERALIZED WEAKNESS: ICD-10-CM

## 2024-09-29 LAB
ALBUMIN SERPL BCP-MCNC: 4.2 G/DL (ref 3.4–5)
ALP SERPL-CCNC: 143 U/L (ref 33–136)
ALT SERPL W P-5'-P-CCNC: 10 U/L (ref 10–52)
ANION GAP SERPL CALC-SCNC: 11 MMOL/L (ref 10–20)
APPEARANCE UR: CLEAR
AST SERPL W P-5'-P-CCNC: 13 U/L (ref 9–39)
BACTERIA #/AREA URNS AUTO: ABNORMAL /HPF
BASE EXCESS BLDA CALC-SCNC: 7.6 MMOL/L (ref -2–3)
BASOPHILS # BLD AUTO: 0.07 X10*3/UL (ref 0–0.1)
BASOPHILS NFR BLD AUTO: 0.5 %
BILIRUB SERPL-MCNC: 1.1 MG/DL (ref 0–1.2)
BILIRUB UR STRIP.AUTO-MCNC: NEGATIVE MG/DL
BODY TEMPERATURE: 37 DEGREES CELSIUS
BUN SERPL-MCNC: 13 MG/DL (ref 6–23)
CALCIUM SERPL-MCNC: 9.5 MG/DL (ref 8.6–10.3)
CARDIAC TROPONIN I PNL SERPL HS: 5 NG/L (ref 0–20)
CARDIAC TROPONIN I PNL SERPL HS: 5 NG/L (ref 0–20)
CHLORIDE SERPL-SCNC: 97 MMOL/L (ref 98–107)
CO2 SERPL-SCNC: 33 MMOL/L (ref 21–32)
COLOR UR: ABNORMAL
CREAT SERPL-MCNC: 0.79 MG/DL (ref 0.5–1.3)
D DIMER PPP FEU-MCNC: 342 NG/ML FEU
EGFRCR SERPLBLD CKD-EPI 2021: >90 ML/MIN/1.73M*2
EOSINOPHIL # BLD AUTO: 0.01 X10*3/UL (ref 0–0.7)
EOSINOPHIL NFR BLD AUTO: 0.1 %
ERYTHROCYTE [DISTWIDTH] IN BLOOD BY AUTOMATED COUNT: 12.8 % (ref 11.5–14.5)
FLUAV RNA RESP QL NAA+PROBE: NOT DETECTED
FLUBV RNA RESP QL NAA+PROBE: NOT DETECTED
GLUCOSE SERPL-MCNC: 178 MG/DL (ref 74–99)
GLUCOSE UR STRIP.AUTO-MCNC: ABNORMAL MG/DL
HCO3 BLDA-SCNC: 34.5 MMOL/L (ref 22–26)
HCT VFR BLD AUTO: 48.1 % (ref 41–52)
HGB BLD-MCNC: 15.8 G/DL (ref 13.5–17.5)
IMM GRANULOCYTES # BLD AUTO: 0.04 X10*3/UL (ref 0–0.7)
IMM GRANULOCYTES NFR BLD AUTO: 0.3 % (ref 0–0.9)
INHALED O2 CONCENTRATION: 28 %
KETONES UR STRIP.AUTO-MCNC: ABNORMAL MG/DL
LACTATE SERPL-SCNC: 1.7 MMOL/L (ref 0.4–2)
LEUKOCYTE ESTERASE UR QL STRIP.AUTO: NEGATIVE
LYMPHOCYTES # BLD AUTO: 0.69 X10*3/UL (ref 1.2–4.8)
LYMPHOCYTES NFR BLD AUTO: 4.9 %
MCH RBC QN AUTO: 29.8 PG (ref 26–34)
MCHC RBC AUTO-ENTMCNC: 32.8 G/DL (ref 32–36)
MCV RBC AUTO: 91 FL (ref 80–100)
MONOCYTES # BLD AUTO: 1.09 X10*3/UL (ref 0.1–1)
MONOCYTES NFR BLD AUTO: 7.7 %
MUCOUS THREADS #/AREA URNS AUTO: ABNORMAL /LPF
NEUTROPHILS # BLD AUTO: 12.18 X10*3/UL (ref 1.2–7.7)
NEUTROPHILS NFR BLD AUTO: 86.5 %
NITRITE UR QL STRIP.AUTO: NEGATIVE
NRBC BLD-RTO: 0 /100 WBCS (ref 0–0)
OXYHGB MFR BLDA: 78 % (ref 94–98)
PCO2 BLDA: 57 MM HG (ref 38–42)
PH BLDA: 7.39 PH (ref 7.38–7.42)
PH UR STRIP.AUTO: 6 [PH]
PLATELET # BLD AUTO: 122 X10*3/UL (ref 150–450)
PO2 BLDA: 51 MM HG (ref 85–95)
POTASSIUM SERPL-SCNC: 4.2 MMOL/L (ref 3.5–5.3)
PROT SERPL-MCNC: 7.4 G/DL (ref 6.4–8.2)
PROT UR STRIP.AUTO-MCNC: ABNORMAL MG/DL
RBC # BLD AUTO: 5.31 X10*6/UL (ref 4.5–5.9)
RBC # UR STRIP.AUTO: ABNORMAL /UL
RBC #/AREA URNS AUTO: >20 /HPF
RSV RNA RESP QL NAA+PROBE: NOT DETECTED
SAO2 % BLDA: 84 % (ref 94–100)
SARS-COV-2 RNA RESP QL NAA+PROBE: NOT DETECTED
SODIUM SERPL-SCNC: 137 MMOL/L (ref 136–145)
SP GR UR STRIP.AUTO: 1.01
UROBILINOGEN UR STRIP.AUTO-MCNC: NORMAL MG/DL
WBC # BLD AUTO: 14.1 X10*3/UL (ref 4.4–11.3)
WBC #/AREA URNS AUTO: ABNORMAL /HPF

## 2024-09-29 PROCEDURE — 1100000001 HC PRIVATE ROOM DAILY

## 2024-09-29 PROCEDURE — 94762 N-INVAS EAR/PLS OXIMTRY CONT: CPT

## 2024-09-29 PROCEDURE — 36600 WITHDRAWAL OF ARTERIAL BLOOD: CPT

## 2024-09-29 PROCEDURE — 99285 EMERGENCY DEPT VISIT HI MDM: CPT | Mod: 25

## 2024-09-29 PROCEDURE — 85379 FIBRIN DEGRADATION QUANT: CPT | Performed by: EMERGENCY MEDICINE

## 2024-09-29 PROCEDURE — 94640 AIRWAY INHALATION TREATMENT: CPT

## 2024-09-29 PROCEDURE — 2500000004 HC RX 250 GENERAL PHARMACY W/ HCPCS (ALT 636 FOR OP/ED): Performed by: EMERGENCY MEDICINE

## 2024-09-29 PROCEDURE — 2500000005 HC RX 250 GENERAL PHARMACY W/O HCPCS: Performed by: EMERGENCY MEDICINE

## 2024-09-29 PROCEDURE — 2500000002 HC RX 250 W HCPCS SELF ADMINISTERED DRUGS (ALT 637 FOR MEDICARE OP, ALT 636 FOR OP/ED)

## 2024-09-29 PROCEDURE — 85025 COMPLETE CBC W/AUTO DIFF WBC: CPT | Performed by: EMERGENCY MEDICINE

## 2024-09-29 PROCEDURE — 94664 DEMO&/EVAL PT USE INHALER: CPT

## 2024-09-29 PROCEDURE — 84484 ASSAY OF TROPONIN QUANT: CPT | Performed by: EMERGENCY MEDICINE

## 2024-09-29 PROCEDURE — 82805 BLOOD GASES W/O2 SATURATION: CPT | Performed by: EMERGENCY MEDICINE

## 2024-09-29 PROCEDURE — 36415 COLL VENOUS BLD VENIPUNCTURE: CPT | Performed by: EMERGENCY MEDICINE

## 2024-09-29 PROCEDURE — 96368 THER/DIAG CONCURRENT INF: CPT

## 2024-09-29 PROCEDURE — 81001 URINALYSIS AUTO W/SCOPE: CPT | Performed by: EMERGENCY MEDICINE

## 2024-09-29 PROCEDURE — 87205 SMEAR GRAM STAIN: CPT | Mod: SAMLAB | Performed by: EMERGENCY MEDICINE

## 2024-09-29 PROCEDURE — 96365 THER/PROPH/DIAG IV INF INIT: CPT

## 2024-09-29 PROCEDURE — 71045 X-RAY EXAM CHEST 1 VIEW: CPT

## 2024-09-29 PROCEDURE — 80053 COMPREHEN METABOLIC PANEL: CPT | Performed by: EMERGENCY MEDICINE

## 2024-09-29 PROCEDURE — 9420000001 HC RT PATIENT EDUCATION 5 MIN

## 2024-09-29 PROCEDURE — 87637 SARSCOV2&INF A&B&RSV AMP PRB: CPT | Performed by: EMERGENCY MEDICINE

## 2024-09-29 PROCEDURE — 71045 X-RAY EXAM CHEST 1 VIEW: CPT | Performed by: RADIOLOGY

## 2024-09-29 PROCEDURE — 87077 CULTURE AEROBIC IDENTIFY: CPT | Mod: SAMLAB | Performed by: EMERGENCY MEDICINE

## 2024-09-29 PROCEDURE — 99223 1ST HOSP IP/OBS HIGH 75: CPT | Performed by: INTERNAL MEDICINE

## 2024-09-29 PROCEDURE — 87040 BLOOD CULTURE FOR BACTERIA: CPT | Mod: SAMLAB | Performed by: EMERGENCY MEDICINE

## 2024-09-29 PROCEDURE — 83605 ASSAY OF LACTIC ACID: CPT | Performed by: EMERGENCY MEDICINE

## 2024-09-29 RX ORDER — SODIUM CHLORIDE 9 MG/ML
150 INJECTION, SOLUTION INTRAVENOUS CONTINUOUS
Status: DISCONTINUED | OUTPATIENT
Start: 2024-09-29 | End: 2024-09-29

## 2024-09-29 RX ORDER — PREDNISONE 20 MG/1
40 TABLET ORAL DAILY
Status: DISCONTINUED | OUTPATIENT
Start: 2024-09-30 | End: 2024-10-03 | Stop reason: HOSPADM

## 2024-09-29 RX ORDER — ACETAMINOPHEN 650 MG/1
650 SUPPOSITORY RECTAL EVERY 4 HOURS PRN
Status: DISCONTINUED | OUTPATIENT
Start: 2024-09-29 | End: 2024-10-03 | Stop reason: HOSPADM

## 2024-09-29 RX ORDER — ENOXAPARIN SODIUM 100 MG/ML
40 INJECTION SUBCUTANEOUS EVERY 24 HOURS
Status: DISCONTINUED | OUTPATIENT
Start: 2024-09-30 | End: 2024-10-03 | Stop reason: HOSPADM

## 2024-09-29 RX ORDER — LEVOFLOXACIN 750 MG/1
750 TABLET ORAL
Status: DISCONTINUED | OUTPATIENT
Start: 2024-09-30 | End: 2024-10-03 | Stop reason: HOSPADM

## 2024-09-29 RX ORDER — CEFTRIAXONE 2 G/50ML
2 INJECTION, SOLUTION INTRAVENOUS ONCE
Status: COMPLETED | OUTPATIENT
Start: 2024-09-29 | End: 2024-09-29

## 2024-09-29 RX ORDER — ACETAMINOPHEN 160 MG/5ML
650 SOLUTION ORAL EVERY 4 HOURS PRN
Status: DISCONTINUED | OUTPATIENT
Start: 2024-09-29 | End: 2024-10-03 | Stop reason: HOSPADM

## 2024-09-29 RX ORDER — ALBUTEROL SULFATE 0.83 MG/ML
SOLUTION RESPIRATORY (INHALATION)
Status: COMPLETED
Start: 2024-09-29 | End: 2024-09-29

## 2024-09-29 RX ORDER — POLYETHYLENE GLYCOL 3350 17 G/17G
17 POWDER, FOR SOLUTION ORAL DAILY
Status: DISCONTINUED | OUTPATIENT
Start: 2024-09-30 | End: 2024-10-03 | Stop reason: HOSPADM

## 2024-09-29 RX ORDER — ACETAMINOPHEN 325 MG/1
650 TABLET ORAL EVERY 4 HOURS PRN
Status: DISCONTINUED | OUTPATIENT
Start: 2024-09-29 | End: 2024-10-03 | Stop reason: HOSPADM

## 2024-09-29 RX ORDER — ALBUTEROL SULFATE 0.83 MG/ML
2.5 SOLUTION RESPIRATORY (INHALATION) ONCE
Status: COMPLETED | OUTPATIENT
Start: 2024-09-29 | End: 2024-09-29

## 2024-09-29 RX ORDER — ONDANSETRON HYDROCHLORIDE 2 MG/ML
4 INJECTION, SOLUTION INTRAVENOUS EVERY 8 HOURS PRN
Status: DISCONTINUED | OUTPATIENT
Start: 2024-09-29 | End: 2024-10-03 | Stop reason: HOSPADM

## 2024-09-29 RX ORDER — IPRATROPIUM BROMIDE AND ALBUTEROL SULFATE 2.5; .5 MG/3ML; MG/3ML
3 SOLUTION RESPIRATORY (INHALATION)
Status: DISCONTINUED | OUTPATIENT
Start: 2024-09-30 | End: 2024-10-03 | Stop reason: HOSPADM

## 2024-09-29 RX ORDER — ONDANSETRON 4 MG/1
4 TABLET, ORALLY DISINTEGRATING ORAL EVERY 8 HOURS PRN
Status: DISCONTINUED | OUTPATIENT
Start: 2024-09-29 | End: 2024-10-03 | Stop reason: HOSPADM

## 2024-09-29 RX ADMIN — CEFTRIAXONE SODIUM 2 G: 2 INJECTION, SOLUTION INTRAVENOUS at 18:53

## 2024-09-29 RX ADMIN — Medication 4 L/MIN: at 19:18

## 2024-09-29 RX ADMIN — SODIUM CHLORIDE 1000 ML: 9 INJECTION, SOLUTION INTRAVENOUS at 18:53

## 2024-09-29 RX ADMIN — ALBUTEROL SULFATE 2.5 MG: 2.5 SOLUTION RESPIRATORY (INHALATION) at 18:20

## 2024-09-29 RX ADMIN — ALBUTEROL SULFATE 2.5 MG: 0.83 SOLUTION RESPIRATORY (INHALATION) at 18:20

## 2024-09-29 RX ADMIN — SODIUM CHLORIDE 150 ML/HR: 9 INJECTION, SOLUTION INTRAVENOUS at 20:30

## 2024-09-29 RX ADMIN — AZITHROMYCIN MONOHYDRATE 500 MG: 500 INJECTION, POWDER, LYOPHILIZED, FOR SOLUTION INTRAVENOUS at 18:53

## 2024-09-29 RX ADMIN — Medication 4 L/MIN: at 18:22

## 2024-09-29 SDOH — ECONOMIC STABILITY: INCOME INSECURITY: IN THE PAST 12 MONTHS, HAS THE ELECTRIC, GAS, OIL, OR WATER COMPANY THREATENED TO SHUT OFF SERVICE IN YOUR HOME?: NO

## 2024-09-29 SDOH — SOCIAL STABILITY: SOCIAL INSECURITY: HAS ANYONE EVER THREATENED TO HURT YOUR FAMILY OR YOUR PETS?: NO

## 2024-09-29 SDOH — SOCIAL STABILITY: SOCIAL INSECURITY: ARE YOU OR HAVE YOU BEEN THREATENED OR ABUSED PHYSICALLY, EMOTIONALLY, OR SEXUALLY BY ANYONE?: NO

## 2024-09-29 SDOH — ECONOMIC STABILITY: FOOD INSECURITY: WITHIN THE PAST 12 MONTHS, YOU WORRIED THAT YOUR FOOD WOULD RUN OUT BEFORE YOU GOT MONEY TO BUY MORE.: NEVER TRUE

## 2024-09-29 SDOH — HEALTH STABILITY: MENTAL HEALTH: HOW OFTEN DO YOU HAVE 6 OR MORE DRINKS ON ONE OCCASION?: NEVER

## 2024-09-29 SDOH — SOCIAL STABILITY: SOCIAL INSECURITY: DO YOU FEEL UNSAFE GOING BACK TO THE PLACE WHERE YOU ARE LIVING?: NO

## 2024-09-29 SDOH — SOCIAL STABILITY: SOCIAL INSECURITY: ABUSE: ADULT

## 2024-09-29 SDOH — SOCIAL STABILITY: SOCIAL INSECURITY: DOES ANYONE TRY TO KEEP YOU FROM HAVING/CONTACTING OTHER FRIENDS OR DOING THINGS OUTSIDE YOUR HOME?: NO

## 2024-09-29 SDOH — SOCIAL STABILITY: SOCIAL INSECURITY
WITHIN THE LAST YEAR, HAVE YOU BEEN KICKED, HIT, SLAPPED, OR OTHERWISE PHYSICALLY HURT BY YOUR PARTNER OR EX-PARTNER?: NO

## 2024-09-29 SDOH — SOCIAL STABILITY: SOCIAL INSECURITY
WITHIN THE LAST YEAR, HAVE TO BEEN RAPED OR FORCED TO HAVE ANY KIND OF SEXUAL ACTIVITY BY YOUR PARTNER OR EX-PARTNER?: NO

## 2024-09-29 SDOH — SOCIAL STABILITY: SOCIAL INSECURITY: DO YOU FEEL ANYONE HAS EXPLOITED OR TAKEN ADVANTAGE OF YOU FINANCIALLY OR OF YOUR PERSONAL PROPERTY?: NO

## 2024-09-29 SDOH — SOCIAL STABILITY: SOCIAL INSECURITY: HAVE YOU HAD ANY THOUGHTS OF HARMING ANYONE ELSE?: NO

## 2024-09-29 SDOH — ECONOMIC STABILITY: HOUSING INSECURITY: IN THE PAST 12 MONTHS, HOW MANY TIMES HAVE YOU MOVED WHERE YOU WERE LIVING?: 1

## 2024-09-29 SDOH — HEALTH STABILITY: MENTAL HEALTH: HOW OFTEN DO YOU HAVE A DRINK CONTAINING ALCOHOL?: NEVER

## 2024-09-29 SDOH — ECONOMIC STABILITY: TRANSPORTATION INSECURITY
IN THE PAST 12 MONTHS, HAS THE LACK OF TRANSPORTATION KEPT YOU FROM MEDICAL APPOINTMENTS OR FROM GETTING MEDICATIONS?: NO

## 2024-09-29 SDOH — ECONOMIC STABILITY: HOUSING INSECURITY: AT ANY TIME IN THE PAST 12 MONTHS, WERE YOU HOMELESS OR LIVING IN A SHELTER (INCLUDING NOW)?: NO

## 2024-09-29 SDOH — HEALTH STABILITY: MENTAL HEALTH: HOW MANY STANDARD DRINKS CONTAINING ALCOHOL DO YOU HAVE ON A TYPICAL DAY?: PATIENT DOES NOT DRINK

## 2024-09-29 SDOH — SOCIAL STABILITY: SOCIAL INSECURITY: WERE YOU ABLE TO COMPLETE ALL THE BEHAVIORAL HEALTH SCREENINGS?: YES

## 2024-09-29 SDOH — ECONOMIC STABILITY: FOOD INSECURITY: WITHIN THE PAST 12 MONTHS, THE FOOD YOU BOUGHT JUST DIDN'T LAST AND YOU DIDN'T HAVE MONEY TO GET MORE.: NEVER TRUE

## 2024-09-29 SDOH — ECONOMIC STABILITY: TRANSPORTATION INSECURITY
IN THE PAST 12 MONTHS, HAS LACK OF TRANSPORTATION KEPT YOU FROM MEETINGS, WORK, OR FROM GETTING THINGS NEEDED FOR DAILY LIVING?: NO

## 2024-09-29 SDOH — ECONOMIC STABILITY: INCOME INSECURITY: IN THE LAST 12 MONTHS, WAS THERE A TIME WHEN YOU WERE NOT ABLE TO PAY THE MORTGAGE OR RENT ON TIME?: NO

## 2024-09-29 SDOH — SOCIAL STABILITY: SOCIAL INSECURITY: HAVE YOU HAD THOUGHTS OF HARMING ANYONE ELSE?: NO

## 2024-09-29 SDOH — SOCIAL STABILITY: SOCIAL INSECURITY: WITHIN THE LAST YEAR, HAVE YOU BEEN AFRAID OF YOUR PARTNER OR EX-PARTNER?: NO

## 2024-09-29 SDOH — SOCIAL STABILITY: SOCIAL INSECURITY: ARE THERE ANY APPARENT SIGNS OF INJURIES/BEHAVIORS THAT COULD BE RELATED TO ABUSE/NEGLECT?: NO

## 2024-09-29 SDOH — ECONOMIC STABILITY: INCOME INSECURITY: HOW HARD IS IT FOR YOU TO PAY FOR THE VERY BASICS LIKE FOOD, HOUSING, MEDICAL CARE, AND HEATING?: NOT HARD AT ALL

## 2024-09-29 SDOH — SOCIAL STABILITY: SOCIAL INSECURITY: WITHIN THE LAST YEAR, HAVE YOU BEEN HUMILIATED OR EMOTIONALLY ABUSED IN OTHER WAYS BY YOUR PARTNER OR EX-PARTNER?: NO

## 2024-09-29 ASSESSMENT — COLUMBIA-SUICIDE SEVERITY RATING SCALE - C-SSRS
1. IN THE PAST MONTH, HAVE YOU WISHED YOU WERE DEAD OR WISHED YOU COULD GO TO SLEEP AND NOT WAKE UP?: NO
6. HAVE YOU EVER DONE ANYTHING, STARTED TO DO ANYTHING, OR PREPARED TO DO ANYTHING TO END YOUR LIFE?: NO
1. IN THE PAST MONTH, HAVE YOU WISHED YOU WERE DEAD OR WISHED YOU COULD GO TO SLEEP AND NOT WAKE UP?: NO
6. HAVE YOU EVER DONE ANYTHING, STARTED TO DO ANYTHING, OR PREPARED TO DO ANYTHING TO END YOUR LIFE?: NO
2. HAVE YOU ACTUALLY HAD ANY THOUGHTS OF KILLING YOURSELF?: NO
2. HAVE YOU ACTUALLY HAD ANY THOUGHTS OF KILLING YOURSELF?: NO

## 2024-09-29 ASSESSMENT — ACTIVITIES OF DAILY LIVING (ADL)
WALKS IN HOME: INDEPENDENT
LACK_OF_TRANSPORTATION: NO
GROOMING: INDEPENDENT
FEEDING YOURSELF: INDEPENDENT
HEARING - RIGHT EAR: DIFFICULTY WITH NOISE
JUDGMENT_ADEQUATE_SAFELY_COMPLETE_DAILY_ACTIVITIES: YES
DRESSING YOURSELF: INDEPENDENT
ADEQUATE_TO_COMPLETE_ADL: YES
BATHING: INDEPENDENT
TOILETING: INDEPENDENT
LACK_OF_TRANSPORTATION: NO
PATIENT'S MEMORY ADEQUATE TO SAFELY COMPLETE DAILY ACTIVITIES?: YES
HEARING - LEFT EAR: DIFFICULTY WITH NOISE

## 2024-09-29 ASSESSMENT — COGNITIVE AND FUNCTIONAL STATUS - GENERAL
DAILY ACTIVITIY SCORE: 24
MOBILITY SCORE: 24
PATIENT BASELINE BEDBOUND: NO

## 2024-09-29 ASSESSMENT — LIFESTYLE VARIABLES
SKIP TO QUESTIONS 9-10: 1
AUDIT-C TOTAL SCORE: 0
HOW OFTEN DO YOU HAVE A DRINK CONTAINING ALCOHOL: NEVER
HOW OFTEN DO YOU HAVE A DRINK CONTAINING ALCOHOL: NEVER
SKIP TO QUESTIONS 9-10: 1
HOW OFTEN DO YOU HAVE 6 OR MORE DRINKS ON ONE OCCASION: NEVER
AUDIT-C TOTAL SCORE: 0
HOW OFTEN DO YOU HAVE 6 OR MORE DRINKS ON ONE OCCASION: NEVER
AUDIT-C TOTAL SCORE: 0
SKIP TO QUESTIONS 9-10: 1
SKIP TO QUESTIONS 9-10: 1
HOW MANY STANDARD DRINKS CONTAINING ALCOHOL DO YOU HAVE ON A TYPICAL DAY: PATIENT DOES NOT DRINK
AUDIT-C TOTAL SCORE: 0
HOW MANY STANDARD DRINKS CONTAINING ALCOHOL DO YOU HAVE ON A TYPICAL DAY: PATIENT DOES NOT DRINK

## 2024-09-29 ASSESSMENT — PATIENT HEALTH QUESTIONNAIRE - PHQ9
SUM OF ALL RESPONSES TO PHQ9 QUESTIONS 1 & 2: 0
1. LITTLE INTEREST OR PLEASURE IN DOING THINGS: NOT AT ALL
1. LITTLE INTEREST OR PLEASURE IN DOING THINGS: NOT AT ALL
2. FEELING DOWN, DEPRESSED OR HOPELESS: NOT AT ALL
SUM OF ALL RESPONSES TO PHQ9 QUESTIONS 1 & 2: 0
2. FEELING DOWN, DEPRESSED OR HOPELESS: NOT AT ALL

## 2024-09-29 ASSESSMENT — PAIN - FUNCTIONAL ASSESSMENT
PAIN_FUNCTIONAL_ASSESSMENT: 0-10
PAIN_FUNCTIONAL_ASSESSMENT: 0-10

## 2024-09-29 ASSESSMENT — PAIN SCALES - GENERAL
PAINLEVEL_OUTOF10: 5 - MODERATE PAIN
PAINLEVEL_OUTOF10: 0 - NO PAIN
PAINLEVEL_OUTOF10: 0 - NO PAIN

## 2024-09-29 ASSESSMENT — VISUAL ACUITY: OU: 1

## 2024-09-29 NOTE — ED PROVIDER NOTES
Shortness of breath.  This 63-year-old white male with a history of COPD presents to the ED with plaint of shortness of breath he states that his symptoms started yesterday with runny nose and green mucus production.  He denies any history of fevers, chills or night sweats.  Patient has long history of COPD and was treated with by EMS with IV Solu-Medrol and 2 DuoNeb's prior to arrival to the ED.  Patient continues smoke 1/2 pack/day.  He states that any type of movement makes his symptoms worse rest helps to some extent.  He states that he is normally on 2 L of oxygen via nasal cannula but it was not helping his shortness of breath when he called 911.      History provided by:  Patient and EMS personnel   used: No         Physical Exam  Vitals and nursing note reviewed.   Constitutional:       General: He is awake.      Appearance: Normal appearance. He is cachectic. He is ill-appearing.   HENT:      Head: Normocephalic and atraumatic.      Right Ear: Hearing and external ear normal.      Left Ear: Hearing and external ear normal.      Nose: Rhinorrhea present. No congestion. Rhinorrhea is clear.      Mouth/Throat:      Lips: Pink.      Mouth: Mucous membranes are moist.      Pharynx: Oropharynx is clear. No oropharyngeal exudate or posterior oropharyngeal erythema.   Eyes:      General: Lids are normal. Vision grossly intact.         Right eye: No discharge.         Left eye: No discharge.      Extraocular Movements: Extraocular movements intact.      Conjunctiva/sclera: Conjunctivae normal.      Pupils: Pupils are equal, round, and reactive to light.   Cardiovascular:      Rate and Rhythm: Regular rhythm. Tachycardia present.      Pulses: Normal pulses.      Heart sounds: Normal heart sounds. No murmur heard.     No friction rub. No gallop.   Pulmonary:      Effort: Tachypnea, accessory muscle usage, prolonged expiration, respiratory distress and retractions present.      Breath sounds: No  stridor. Examination of the right-upper field reveals wheezing. Examination of the left-upper field reveals wheezing. Examination of the right-lower field reveals decreased breath sounds. Examination of the left-lower field reveals decreased breath sounds. Decreased breath sounds and wheezing present. No rhonchi or rales.   Chest:      Chest wall: No tenderness.   Abdominal:      General: Abdomen is flat. Bowel sounds are normal. There is no distension.      Palpations: Abdomen is soft. There is no mass.      Tenderness: There is no abdominal tenderness. There is no guarding or rebound.      Hernia: No hernia is present.   Musculoskeletal:         General: No swelling, tenderness, deformity or signs of injury. Normal range of motion.      Cervical back: Full passive range of motion without pain, normal range of motion and neck supple.      Right lower leg: Normal. No edema.      Left lower leg: Normal. No edema.   Skin:     General: Skin is warm and dry.      Capillary Refill: Capillary refill takes less than 2 seconds.      Coloration: Skin is not jaundiced or pale.      Findings: No bruising, erythema, lesion or rash.   Neurological:      General: No focal deficit present.      Mental Status: He is alert and oriented to person, place, and time.      GCS: GCS eye subscore is 4. GCS verbal subscore is 5. GCS motor subscore is 6.      Cranial Nerves: Cranial nerves 2-12 are intact. No cranial nerve deficit.      Sensory: Sensation is intact. No sensory deficit.      Motor: Motor function is intact. No weakness.      Coordination: Coordination is intact. Coordination normal.      Deep Tendon Reflexes: Reflexes normal.   Psychiatric:         Attention and Perception: Attention and perception normal.         Mood and Affect: Mood and affect normal.         Speech: Speech normal.         Behavior: Behavior normal. Behavior is cooperative.         Thought Content: Thought content normal.         Cognition and Memory:  Cognition and memory normal.         Judgment: Judgment normal.          Labs Reviewed   BLOOD GAS ARTERIAL - Abnormal       Result Value    POCT pH, Arterial 7.39      POCT pCO2, Arterial 57 (*)     POCT pO2, Arterial 51 (*)     POCT SO2, Arterial 84 (*)     POCT Oxy Hemoglobin, Arterial 78.0 (*)     POCT Base Excess, Arterial 7.6 (*)     POCT HCO3 Calculated, Arterial 34.5 (*)     Patient Temperature 37.0      FiO2 28     COMPREHENSIVE METABOLIC PANEL - Abnormal    Glucose 178 (*)     Sodium 137      Potassium 4.2      Chloride 97 (*)     Bicarbonate 33 (*)     Anion Gap 11      Urea Nitrogen 13      Creatinine 0.79      eGFR >90      Calcium 9.5      Albumin 4.2      Alkaline Phosphatase 143 (*)     Total Protein 7.4      AST 13      Bilirubin, Total 1.1      ALT 10     CBC WITH AUTO DIFFERENTIAL - Abnormal    WBC 14.1 (*)     nRBC 0.0      RBC 5.31      Hemoglobin 15.8      Hematocrit 48.1      MCV 91      MCH 29.8      MCHC 32.8      RDW 12.8      Platelets 122 (*)     Neutrophils % 86.5      Immature Granulocytes %, Automated 0.3      Lymphocytes % 4.9      Monocytes % 7.7      Eosinophils % 0.1      Basophils % 0.5      Neutrophils Absolute 12.18 (*)     Immature Granulocytes Absolute, Automated 0.04      Lymphocytes Absolute 0.69 (*)     Monocytes Absolute 1.09 (*)     Eosinophils Absolute 0.01      Basophils Absolute 0.07     LACTATE - Normal    Lactate 1.7      Narrative:     Venipuncture immediately after or during the administration of Metamizole may lead to falsely low results. Testing should be performed immediately prior to Metamizole dosing.   SARS-COV-2 PCR - Normal    Coronavirus 2019, PCR Not Detected      Narrative:     This assay has received FDA Emergency Use Authorization (EUA) and is only authorized for the duration of time that circumstances exist to justify the authorization of the emergency use of in vitro diagnostic tests for the detection of SARS-CoV-2 virus and/or diagnosis of COVID-19  infection under section 564(b)(1) of the Act, 21 U.S.C. 360bbb-3(b)(1). This assay is an in vitro diagnostic nucleic acid amplification test for the qualitative detection of SARS-CoV-2 from nasopharyngeal specimens and has been validated for use at [facility]. Negative results do not preclude COVID-19 infections and should not be used as the sole basis for diagnosis, treatment, or other management decisions.     RSV PCR - Normal    RSV PCR Not Detected      Narrative:     This assay is an FDA-cleared, in vitro diagnostic nucleic acid amplification test for the detection of RSV from nasopharyngeal specimens, and has been validated for use at [facility]. Negative results do not preclude RSV infections, and should not be used as the sole basis for diagnosis, treatment, or other management decisions. If Influenza A/B and RSV PCR results are negative, testing for Parainfluenza virus, Adenovirus and Metapneumovirus is routinely performed for pediatric oncology and [place] care [place] [facility] [place] and is available on other patients by placing an add-on request.       INFLUENZA A AND B PCR - Normal    Flu A Result Not Detected      Flu B Result Not Detected      Narrative:     This assay is an in vitro diagnostic multiplex nucleic acid amplification test for the detection and discrimination of Influenza A & B from nasopharyngeal specimens, and has been validated for use at [facility]. Negative results do not preclude Influenza A/B infections, and should not be used as the sole basis for diagnosis, treatment, or other management decisions. If Influenza A/B and RSV PCR results are negative, testing for Parainfluenza virus, Adenovirus and Metapneumovirus is routinely performed for [facility] intensive care in[place], and is available on other patients by placing an add-on request.   D-DIMER, VTE EXCLUSION - Normal    D-Dimer, Quantitative  VTE Exclusion 342      Narrative:     The VTE Exclusion D-Dimer assay is reported in ng/mL Fibrinogen Equivalent Units (FEU).    Per 's instructions for use, a value of less than 500 ng/mL (FEU) may help to exclude DVT or PE in outpatients when the assay is used with a clinical pretest probability assessment.([facility] must utilize and document eCalc 'Wells Score Deep Vein Thrombosis Risk' for DVT exclusion only. Emergency Department should utilize  Guidelines for Emergency Department Use of the VTE Exclusion D-Dimer and Clinical Pretest probability assessment model for DVT or PE exclusion.)   SERIAL TROPONIN-INITIAL - Normal    Troponin I, High Sensitivity 5      Narrative:     Less than 99th percentile of normal range cutoff-  Female and children under 18 years old <14 ng/L; Male <21 ng/L: Negative  Repeat testing should be performed if clinically indicated.     Female and children under 18 years old 14-50 ng/L; Male 21-50 ng/L:  Consistent with possible cardiac damage and possible increased clinical   risk. Serial measurements may help to assess extent of myocardial damage.     >50 ng/L: Consistent with cardiac damage, increased clinical risk and  myocardial infarction. Serial measurements may help assess extent of   myocardial damage.      NOTE: Children less than 1 year old may have higher baseline troponin   levels and results should be interpreted in conjunction with the overall   clinical context.     NOTE: Troponin I testing is performed using a different   testing methodology at [facility] than at other   [facility]. Direct result comparisons should only   be made within the same method.   BLOOD CULTURE   BLOOD CULTURE   URINALYSIS WITH REFLEX CULTURE AND MICROSCOPIC    Narrative:     The following orders were created for panel order Urinalysis with Reflex Culture and Microscopic.  Procedure                               Abnormality         Status                     ---------                                -----------         ------                     Urinalysis with Reflex C...[[id number]]                                                 Extra Urine Gray Tube[[id number]]                                                         Please view results for these tests on the individual orders.   TROPONIN SERIES- (INITIAL, 1 HR)    Narrative:     The following orders were created for panel order Troponin I Series, High Sensitivity (0, 1 HR).  Procedure                               Abnormality         Status                     ---------                               -----------         ------                     Troponin I, High Sensiti...[[id number]]  Normal              Final result               Troponin, High Sensitivi...[[id number]]                                                   Please view results for these tests on the individual orders.   URINALYSIS WITH REFLEX CULTURE AND MICROSCOPIC   EXTRA URINE GRAY TUBE   SERIAL TROPONIN, 1 HOUR   URINALYSIS WITH REFLEX CULTURE AND MICROSCOPIC    Narrative:     The following orders were created for panel order Urinalysis with Reflex Culture and Microscopic.  Procedure                               Abnormality         Status                     ---------                               -----------         ------                     Urinalysis with Reflex C...[[id number]]                                                 Extra Urine Gray Tube[[id number]]                                                         Please view results for these tests on the individual orders.   URINALYSIS WITH REFLEX CULTURE AND MICROSCOPIC   EXTRA URINE GRAY TUBE        XR chest 1 view   Final Result   1. Patchy bibasilar airspace disease concerning for pneumonia.   Radiographic follow-up to resolution in 3-4 weeks is advised   2. Advanced emphysematous changes.                  MACRO:   None        Signed by: Mejia Whalen 9/29/2024 6:37 PM   Dictation workstation:    [id number]           Procedures     Medical Decision Making  Patient presented to the ED with complaint of COPD exacerbation with shortness of breath.  ABG was performed which revealed that he was compensated and subsequently due to his hypoxia his oxygen level was increased to 4 L via nasal cannula.  The patient then got a elevation of his white blood cell count and met criteria for sepsis.  Subsequently I treated him for suspected pneumonia after getting blood cultures.  Patient care was turned over to the oncoming ED attending at 7 PM.  The patient will require hospitalization for his respiratory status and sepsis.    Amount and/or Complexity of Data Reviewed  ECG/medicine tests: independent interpretation performed.     Details: EKG was interpreted by myself at 6:01 PM reveals sinus tach with occasional premature ventricular complexes and right atrial enlargement.  There is noted to be a lot of baseline artifact due to respiratory status.  Heart rates 120 bpm.  The MN interval is 122 ms.  The QRS durations 64 ms.  The QTc is 424 ms.  Axis is 85 degrees                           Huan Vines, DO  09/29/24 1935

## 2024-09-30 LAB
ANION GAP SERPL CALC-SCNC: 11 MMOL/L (ref 10–20)
BACTERIA SPEC RESP CULT: ABNORMAL
BUN SERPL-MCNC: 13 MG/DL (ref 6–23)
CALCIUM SERPL-MCNC: 8.9 MG/DL (ref 8.6–10.3)
CHLORIDE SERPL-SCNC: 102 MMOL/L (ref 98–107)
CO2 SERPL-SCNC: 30 MMOL/L (ref 21–32)
CREAT SERPL-MCNC: 0.65 MG/DL (ref 0.5–1.3)
EGFRCR SERPLBLD CKD-EPI 2021: >90 ML/MIN/1.73M*2
ERYTHROCYTE [DISTWIDTH] IN BLOOD BY AUTOMATED COUNT: 12.7 % (ref 11.5–14.5)
GLUCOSE SERPL-MCNC: 205 MG/DL (ref 74–99)
GRAM STN SPEC: ABNORMAL
GRAM STN SPEC: ABNORMAL
HCT VFR BLD AUTO: 43.2 % (ref 41–52)
HGB BLD-MCNC: 14.3 G/DL (ref 13.5–17.5)
HOLD SPECIMEN: NORMAL
HOLD SPECIMEN: NORMAL
MCH RBC QN AUTO: 30 PG (ref 26–34)
MCHC RBC AUTO-ENTMCNC: 33.1 G/DL (ref 32–36)
MCV RBC AUTO: 91 FL (ref 80–100)
NRBC BLD-RTO: 0 /100 WBCS (ref 0–0)
PLATELET # BLD AUTO: 146 X10*3/UL (ref 150–450)
POTASSIUM SERPL-SCNC: 4.5 MMOL/L (ref 3.5–5.3)
RBC # BLD AUTO: 4.77 X10*6/UL (ref 4.5–5.9)
SODIUM SERPL-SCNC: 138 MMOL/L (ref 136–145)
WBC # BLD AUTO: 17.1 X10*3/UL (ref 4.4–11.3)

## 2024-09-30 PROCEDURE — 82374 ASSAY BLOOD CARBON DIOXIDE: CPT | Performed by: INTERNAL MEDICINE

## 2024-09-30 PROCEDURE — 1100000001 HC PRIVATE ROOM DAILY

## 2024-09-30 PROCEDURE — 2500000001 HC RX 250 WO HCPCS SELF ADMINISTERED DRUGS (ALT 637 FOR MEDICARE OP): Performed by: INTERNAL MEDICINE

## 2024-09-30 PROCEDURE — 2500000002 HC RX 250 W HCPCS SELF ADMINISTERED DRUGS (ALT 637 FOR MEDICARE OP, ALT 636 FOR OP/ED): Performed by: NURSE PRACTITIONER

## 2024-09-30 PROCEDURE — S4991 NICOTINE PATCH NONLEGEND: HCPCS | Performed by: NURSE PRACTITIONER

## 2024-09-30 PROCEDURE — 94640 AIRWAY INHALATION TREATMENT: CPT

## 2024-09-30 PROCEDURE — 2500000002 HC RX 250 W HCPCS SELF ADMINISTERED DRUGS (ALT 637 FOR MEDICARE OP, ALT 636 FOR OP/ED): Performed by: INTERNAL MEDICINE

## 2024-09-30 PROCEDURE — 2500000005 HC RX 250 GENERAL PHARMACY W/O HCPCS: Performed by: INTERNAL MEDICINE

## 2024-09-30 PROCEDURE — 2500000004 HC RX 250 GENERAL PHARMACY W/ HCPCS (ALT 636 FOR OP/ED): Performed by: INTERNAL MEDICINE

## 2024-09-30 PROCEDURE — 85027 COMPLETE CBC AUTOMATED: CPT | Performed by: INTERNAL MEDICINE

## 2024-09-30 PROCEDURE — 36415 COLL VENOUS BLD VENIPUNCTURE: CPT | Performed by: INTERNAL MEDICINE

## 2024-09-30 PROCEDURE — 99233 SBSQ HOSP IP/OBS HIGH 50: CPT | Performed by: HOSPITALIST

## 2024-09-30 PROCEDURE — 87205 SMEAR GRAM STAIN: CPT | Mod: SAMLAB | Performed by: INTERNAL MEDICINE

## 2024-09-30 RX ORDER — IBUPROFEN 200 MG
1 TABLET ORAL DAILY
Status: DISCONTINUED | OUTPATIENT
Start: 2024-09-30 | End: 2024-10-03 | Stop reason: HOSPADM

## 2024-09-30 RX ADMIN — PREDNISONE 40 MG: 20 TABLET ORAL at 09:54

## 2024-09-30 RX ADMIN — Medication 4 L/MIN: at 05:43

## 2024-09-30 RX ADMIN — Medication 4 L/MIN: at 18:16

## 2024-09-30 RX ADMIN — NICOTINE 1 PATCH: 14 PATCH, EXTENDED RELEASE TRANSDERMAL at 12:50

## 2024-09-30 RX ADMIN — LEVOFLOXACIN 750 MG: 750 TABLET, FILM COATED ORAL at 09:54

## 2024-09-30 RX ADMIN — Medication 6 L/MIN: at 09:48

## 2024-09-30 RX ADMIN — ENOXAPARIN SODIUM 40 MG: 40 INJECTION SUBCUTANEOUS at 00:03

## 2024-09-30 RX ADMIN — IPRATROPIUM BROMIDE AND ALBUTEROL SULFATE 3 ML: .5; 3 SOLUTION RESPIRATORY (INHALATION) at 05:41

## 2024-09-30 RX ADMIN — IPRATROPIUM BROMIDE AND ALBUTEROL SULFATE 3 ML: .5; 3 SOLUTION RESPIRATORY (INHALATION) at 18:14

## 2024-09-30 RX ADMIN — Medication 4 L/MIN: at 23:39

## 2024-09-30 RX ADMIN — IPRATROPIUM BROMIDE AND ALBUTEROL SULFATE 3 ML: .5; 3 SOLUTION RESPIRATORY (INHALATION) at 23:37

## 2024-09-30 RX ADMIN — IPRATROPIUM BROMIDE AND ALBUTEROL SULFATE 3 ML: .5; 3 SOLUTION RESPIRATORY (INHALATION) at 11:10

## 2024-09-30 RX ADMIN — Medication 4 L/MIN: at 03:53

## 2024-09-30 RX ADMIN — ENOXAPARIN SODIUM 40 MG: 40 INJECTION SUBCUTANEOUS at 23:08

## 2024-09-30 RX ADMIN — PANCRELIPASE 2 CAPSULE: 36000; 180000; 114000 CAPSULE, DELAYED RELEASE PELLETS ORAL at 09:54

## 2024-09-30 RX ADMIN — PANCRELIPASE 2 CAPSULE: 36000; 180000; 114000 CAPSULE, DELAYED RELEASE PELLETS ORAL at 16:53

## 2024-09-30 RX ADMIN — PANCRELIPASE 2 CAPSULE: 36000; 180000; 114000 CAPSULE, DELAYED RELEASE PELLETS ORAL at 12:43

## 2024-09-30 ASSESSMENT — PAIN - FUNCTIONAL ASSESSMENT
PAIN_FUNCTIONAL_ASSESSMENT: 0-10

## 2024-09-30 ASSESSMENT — PAIN SCALES - GENERAL
PAINLEVEL_OUTOF10: 0 - NO PAIN

## 2024-09-30 ASSESSMENT — ACTIVITIES OF DAILY LIVING (ADL): LACK_OF_TRANSPORTATION: NO

## 2024-09-30 NOTE — ED PROVIDER NOTES
Emergency Medicine Transition of Care Note.    I received Dallin Crawford in signout from Dr. Vines.  Please see the previous ED provider note for all HPI, PE and MDM up to the time of signout at 1900. This is in addition to the primary record.  I did speak to the patient about the chest x-ray finding which indicates COPD along with bilateral lower lobe pneumonia.  Patient had already be given 1 g of Rocephin and will be given 500 of IV Zithromax.  Patient will be admitted for further inpatient treatment.    In brief Dallin Crawford is an 64 y.o. male presenting for   Chief Complaint   Patient presents with    Shortness of Breath     Increasing SOB and cough since yesterday.      At the time of signout we were awaiting: blood work.    ED Course as of 09/29/24 2121   Sun Sep 29, 2024   2119 Twelve-lead EKG interpreted at 1801 1 sinus tachycardia 2 right atrial enlargement 3 some nonspecific ST-T wave changes [MS]      ED Course User Index  [MS] Jeffrey Moreno, DO         Diagnoses as of 09/29/24 2121   Pneumonia of both lower lobes due to infectious organism   COPD exacerbation (Multi)     Labs Reviewed   BLOOD GAS ARTERIAL - Abnormal       Result Value    POCT pH, Arterial 7.39      POCT pCO2, Arterial 57 (*)     POCT pO2, Arterial 51 (*)     POCT SO2, Arterial 84 (*)     POCT Oxy Hemoglobin, Arterial 78.0 (*)     POCT Base Excess, Arterial 7.6 (*)     POCT HCO3 Calculated, Arterial 34.5 (*)     Patient Temperature 37.0      FiO2 28     COMPREHENSIVE METABOLIC PANEL - Abnormal    Glucose 178 (*)     Sodium 137      Potassium 4.2      Chloride 97 (*)     Bicarbonate 33 (*)     Anion Gap 11      Urea Nitrogen 13      Creatinine 0.79      eGFR >90      Calcium 9.5      Albumin 4.2      Alkaline Phosphatase 143 (*)     Total Protein 7.4      AST 13      Bilirubin, Total 1.1      ALT 10     CBC WITH AUTO DIFFERENTIAL - Abnormal    WBC 14.1 (*)     nRBC 0.0      RBC 5.31      Hemoglobin 15.8      Hematocrit 48.1       MCV 91      MCH 29.8      MCHC 32.8      RDW 12.8      Platelets 122 (*)     Neutrophils % 86.5      Immature Granulocytes %, Automated 0.3      Lymphocytes % 4.9      Monocytes % 7.7      Eosinophils % 0.1      Basophils % 0.5      Neutrophils Absolute 12.18 (*)     Immature Granulocytes Absolute, Automated 0.04      Lymphocytes Absolute 0.69 (*)     Monocytes Absolute 1.09 (*)     Eosinophils Absolute 0.01      Basophils Absolute 0.07     URINALYSIS WITH REFLEX CULTURE AND MICROSCOPIC - Abnormal    Color, Urine Light-Yellow      Appearance, Urine Clear      Specific Gravity, Urine 1.014      pH, Urine 6.0      Protein, Urine 20 (TRACE)      Glucose, Urine 500 (3+) (*)     Blood, Urine 1.0 (3+) (*)     Ketones, Urine 10 (1+) (*)     Bilirubin, Urine NEGATIVE      Urobilinogen, Urine Normal      Nitrite, Urine NEGATIVE      Leukocyte Esterase, Urine NEGATIVE     URINALYSIS MICROSCOPIC WITH REFLEX CULTURE - Abnormal    WBC, Urine 1-5      RBC, Urine >20 (*)     Bacteria, Urine 1+ (*)     Mucus, Urine FEW     LACTATE - Normal    Lactate 1.7      Narrative:     Venipuncture immediately after or during the administration of Metamizole may lead to falsely low results. Testing should be performed immediately prior to Metamizole dosing.   SARS-COV-2 PCR - Normal    Coronavirus 2019, PCR Not Detected      Narrative:     This assay has received FDA Emergency Use Authorization (EUA) and is only authorized for the duration of time that circumstances exist to justify the authorization of the emergency use of in vitro diagnostic tests for the detection of SARS-CoV-2 virus and/or diagnosis of COVID-19 infection under section 564(b)(1) of the Act, 21 U.S.C. 360bbb-3(b)(1). This assay is an in vitro diagnostic nucleic acid amplification test for the qualitative detection of SARS-CoV-2 from nasopharyngeal specimens and has been validated for use at [facility]. Negative results do not preclude COVID-19  infections and should not be used as the sole basis for diagnosis, treatment, or other management decisions.     RSV PCR - Normal    RSV PCR Not Detected      Narrative:     This assay is an FDA-cleared, in vitro diagnostic nucleic acid amplification test for the detection of RSV from nasopharyngeal specimens, and has been validated for use at [facility]. Negative results do not preclude RSV infections, and should not be used as the sole basis for diagnosis, treatment, or other management decisions. If Influenza A/B and RSV PCR results are negative, testing for Parainfluenza virus, Adenovirus and Metapneumovirus is routinely performed for pediatric oncology and intensive care inpatients at [facility] [place], and is available on other patients by placing an add-on request.       INFLUENZA A AND B PCR - Normal    Flu A Result Not Detected      Flu B Result Not Detected      Narrative:     This assay is an in vitro diagnostic multiplex nucleic acid amplification test for the detection and discrimination of Influenza A & B from nasopharyngeal specimens, and has been validated for use at [facility]. Negative results do not preclude Influenza A/B infections, and should not be used as the sole basis for diagnosis, treatment, or other management decisions. If Influenza A/B and RSV PCR results are negative, testing for Parainfluenza virus, Adenovirus and Metapneumovirus is routinely performed for [facility] pediatric oncology and intensive care inpatients, and is available on other patients by placing an add-on request.   D-DIMER, VTE EXCLUSION - Normal    D-Dimer, Quantitative VTE Exclusion 342      Narrative:     The VTE Exclusion D-Dimer assay is reported in ng/mL Fibrinogen Equivalent Units (FEU).    Per 's instructions for use, a value of less than 500 ng/mL (FEU) may help to exclude DVT or PE in outpatients when the assay is used with a clinical pretest probability  assessment.(AE must utilize and document eCalc 'Wells Score Deep Vein Thrombosis Risk' for DVT exclusion only. Emergency Department should utilize  Guidelines for Emergency Department Use of the VTE Exclusion D-Dimer and Clinical Pretest probability assessment model for DVT or PE exclusion.)   SERIAL TROPONIN-INITIAL - Normal    Troponin I, High Sensitivity 5      Narrative:     Less than 99th percentile of normal range cutoff-  Female and children under 18 years old <14 ng/L; Male <21 ng/L: Negative  Repeat testing should be performed if clinically indicated.     Female and children under 18 years old 14-50 ng/L; Male 21-50 ng/L:  Consistent with possible cardiac damage and possible increased clinical   risk. Serial measurements may help to assess extent of myocardial damage.     >50 ng/L: Consistent with cardiac damage, increased clinical risk and  myocardial infarction. Serial measurements may help assess extent of   myocardial damage.      NOTE: Children less than 1 year old may have higher baseline troponin   levels and results should be interpreted in conjunction with the overall   clinical context.     NOTE: Troponin I testing is performed using a different   testing methodology at [facility] than at other   [facility]. Direct result comparisons should only   be made within the same method.   SERIAL TROPONIN, 1 HOUR - Normal    Troponin I, High Sensitivity 5      Narrative:     Less than 99th percentile of normal range cutoff-  Female and children under 18 years old <14 ng/L; Male <21 ng/L: Negative  Repeat testing should be performed if clinically indicated.     Female and children under 18 years old 14-50 ng/L; Male 21-50 ng/L:  Consistent with possible cardiac damage and possible increased clinical   risk. Serial measurements may help to assess extent of myocardial damage.     >50 ng/L: Consistent with cardiac damage, increased clinical risk and  myocardial infarction. Serial  measurements may help assess extent of   myocardial damage.      NOTE: Children less than 1 year old may have higher baseline troponin   levels and results should be interpreted in conjunction with the overall   clinical context.     NOTE: Troponin I testing is performed using a different   testing methodology at [facility] than at other   [facility]. Direct result comparisons should only   be made within the same method.   BLOOD CULTURE   BLOOD CULTURE   TROPONIN SERIES- (INITIAL, 1 HR)    Narrative:     The following orders were created for panel order Troponin I Series, High Sensitivity (0, 1 HR).  Procedure                               Abnormality         Status                     ---------                               -----------         ------                     Troponin I, High Sensiti...[[id number]]  Normal              Final result               Troponin, High Sensitivi...[[id number]]  Normal              Final result                 Please view results for these tests on the individual orders.   URINALYSIS WITH REFLEX CULTURE AND MICROSCOPIC    Narrative:     The following orders were created for panel order Urinalysis with Reflex Culture and Microscopic.  Procedure                               Abnormality         Status                     ---------                               -----------         ------                     Urinalysis with Reflex C...[[id number]]  Abnormal            Final result               Extra Urine Gray Tube[[id number]]                            In process                   Please view results for these tests on the individual orders.   EXTRA URINE GRAY TUBE      XR chest 1 view   Final Result   1. Patchy bibasilar airspace disease concerning for pneumonia.   Radiographic follow-up to resolution in 3-4 weeks is advised   2. Advanced emphysematous changes.                  MACRO:   None        Signed by: Mejia Whalen 9/29/2024 6:37 PM   Dictation  workstation:   [id number]         Medical Decision Making      Final diagnoses:   [J18.9] Pneumonia of both lower lobes due to infectious organism   [J44.1] COPD exacerbation (Multi)           Procedure  Procedures    DO Jeffrey Ramirez DO  09/29/24 0684

## 2024-09-30 NOTE — CONSULTS
"Nutrition Initial Assessment:   Nutrition Assessment    Reason for Assessment: Admission nursing screening    Patient is a 64 y.o. male presenting with shortness of breath. Pt with hx of COPD. Smokes about 1/2 a pack a day. Had one day of poor appetite due to his shortness of breath but appetite is back. Eating 75% of meals. Consulted for wt loss but wt is up 5 lbs over past 3-4 months. Denies N/V/D/C at this time. No nutritional concerns at this time.       Nutrition History:  Energy Intake: Good > 75 %  Food and Nutrient History:  (Eating well, all meals above 75%. No appetite changes or wt loss noted.)  Food Allergies/Intolerances:  None  GI Symptoms: None  Oral Problems: None       Anthropometrics:  Height: 175.3 cm (5' 9\")   Weight: 49.9 kg (110 lb)   BMI (Calculated): 16.24    Weight Change %:  Weight History / % Weight Change:  (wts stable x 6 months)    Nutrition Focused Physical Exam Findings:    Subcutaneous Fat Loss:   Orbital Fat Pads: Mild-Moderate (slight dark circles and slight hollowing)  Buccal Fat Pads: Mild-Moderate (flat cheeks, minimal bounce)  Muscle Wasting:  Temporalis: Mild-Moderate (slight depression)  Pectoralis (Clavicular Region): Severe (protruding prominent clavicle)  Edema:  Edema: none  Physical Findings:  Hair: Negative  Eyes: Negative  Mouth: Negative  Skin: Negative    Nutrition Significant Labs:  CBC Trend:   Results from last 7 days   Lab Units 09/30/24  0450 09/29/24  1817   WBC AUTO x10*3/uL 17.1* 14.1*   RBC AUTO x10*6/uL 4.77 5.31   HEMOGLOBIN g/dL 14.3 15.8   HEMATOCRIT % 43.2 48.1   MCV fL 91 91   PLATELETS AUTO x10*3/uL 146* 122*    , BMP Trend:   Results from last 7 days   Lab Units 09/30/24  0450 09/29/24  1817   GLUCOSE mg/dL 205* 178*   CALCIUM mg/dL 8.9 9.5   SODIUM mmol/L 138 137   POTASSIUM mmol/L 4.5 4.2   CO2 mmol/L 30 33*   CHLORIDE mmol/L 102 97*   BUN mg/dL 13 13   CREATININE mg/dL 0.65 0.79    , A1C:  Lab Results   Component Value Date    HGBA1C 6.1 (H) " 02/12/2024       Nutrition Specific Medications:  enoxaparin, 40 mg, subcutaneous, q24h  ipratropium-albuteroL, 3 mL, nebulization, q6h  levoFLOXacin, 750 mg, oral, q24h JOSE A  pancrelipase (Lip-Prot-Amyl), 2 capsule, oral, TID  polyethylene glycol, 17 g, oral, Daily  predniSONE, 40 mg, oral, Daily         I/O:   Last BM Date: 09/30/24; Stool Appearance: Formed (09/30/24 1020)    Dietary Orders (From admission, onward)       Start     Ordered    09/29/24 2338  Adult diet Regular  Diet effective now        Question:  Diet type  Answer:  Regular    09/29/24 2337                     Estimated Needs:   Total Energy Estimated Needs (kCal): 2100 kCal  Method for Estimating Needs: 30 kcals/kg IBW  Total Protein Estimated Needs (g): 71 g  Method for Estimating Needs: 1g/kg IBW  Total Fluid Estimated Needs (mL): 2100 mL  Method for Estimating Needs: 1 ml/kcal        Nutrition Diagnosis        Nutrition Diagnosis  Patient has Nutrition Diagnosis: Yes  Diagnosis Status (1): New  Nutrition Diagnosis 1: Increased nutrient needs  Related to (1): loss of subcutaneous fat and muscle  As Evidenced by (1): slight depression of temporalis, slight hollowing of orbital fat pads.       Nutrition Interventions/Recommendations         Nutrition Prescription:  Individualized Nutrition Prescription Provided for :  (Individual nutrition prescription of 2100 kcals and 71g of protien to be provided with diet order.)        Nutrition Interventions:   Interventions: Meals and snacks  Meals and Snacks:  (Continue with good PO intakes.)  Goal:  (goal of >75% of all meals.)      Nutrition Monitoring and Evaluation   Food/Nutrient Related History Monitoring  Monitoring and Evaluation Plan: Energy intake  Energy Intake: Estimated energy intake  Criteria: Continue with good PO intakes, goal of >75% of all meals.    Body Composition/Growth/Weight History  Monitoring and Evaluation Plan: Weight  Weight: Weight change  Criteria: Continue to monitor wt  status and wt change.      Time Spent (min): 60 minutes

## 2024-09-30 NOTE — CARE PLAN
Problem: Nutrition  Goal: Less than 5 days NPO/clear liquids  9/30/2024 0943 by Ese Malcolm RN  Outcome: Progressing  9/30/2024 0943 by Ese Malcolm RN  Outcome: Progressing  Goal: Oral intake greater than 50%  9/30/2024 0943 by Ese Malcolm RN  Outcome: Progressing  9/30/2024 0943 by Ese Malcolm RN  Outcome: Progressing  Goal: Oral intake greater 75%  9/30/2024 0943 by Ese Malcolm RN  Outcome: Progressing  9/30/2024 0943 by Ese Malcolm RN  Outcome: Progressing  Goal: Consume prescribed supplement  9/30/2024 0943 by Ese Malcolm RN  Outcome: Progressing  9/30/2024 0943 by Ese Malcolm RN  Outcome: Progressing  Goal: Adequate PO fluid intake  9/30/2024 0943 by Ese Malcolm RN  Outcome: Progressing  9/30/2024 0943 by Ese Malcolm RN  Outcome: Progressing  Goal: Nutrition support goals are met within 48 hrs  9/30/2024 0943 by Ese Malcolm RN  Outcome: Progressing  9/30/2024 0943 by Ese Malcolm RN  Outcome: Progressing  Goal: Nutrition support is meeting 75% of nutrient needs  9/30/2024 0943 by Ese Malcolm RN  Outcome: Progressing  9/30/2024 0943 by Ese Malcolm RN  Outcome: Progressing  Goal: Tube feed tolerance  9/30/2024 0943 by Ese Malcolm RN  Outcome: Progressing  9/30/2024 0943 by Ese Malcolm RN  Outcome: Progressing  Goal: BG  mg/dL  9/30/2024 0943 by Ese Malcolm RN  Outcome: Progressing  9/30/2024 0943 by Ese Malcolm RN  Outcome: Progressing  Goal: Lab values WNL  9/30/2024 0943 by Ese Malcolm RN  Outcome: Progressing  9/30/2024 0943 by Ese Malcolm RN  Outcome: Progressing  Goal: Electrolytes WNL  9/30/2024 0943 by Ese Malcolm RN  Outcome: Progressing  9/30/2024 0943 by Ese Malcolm RN  Outcome: Progressing  Goal: Promote healing  9/30/2024 0943 by Ese Ana Malcolm, RN  Outcome: Progressing  9/30/2024 0943 by Ese Malcolm RN  Outcome: Progressing  Goal:  Maintain stable weight  9/30/2024 0943 by Ese Malcolm RN  Outcome: Progressing  9/30/2024 0943 by Ese Malcolm RN  Outcome: Progressing  Goal: Reduce weight from edema/fluid  9/30/2024 0943 by Ese Malcolm RN  Outcome: Progressing  9/30/2024 0943 by Ese Malcolm RN  Outcome: Progressing  Goal: Gradual weight gain  9/30/2024 0943 by Ese Malcolm RN  Outcome: Progressing  9/30/2024 0943 by Ese Malcolm RN  Outcome: Progressing  Goal: Improve ostomy output  9/30/2024 0943 by Ese Malcolm RN  Outcome: Progressing  9/30/2024 0943 by Ese Malcolm RN  Outcome: Progressing     Problem: Respiratory  Goal: Clear secretions with interventions this shift  Outcome: Progressing  Goal: No signs of respiratory distress (eg. Use of accessory muscles. Peds grunting)  Outcome: Progressing  Goal: Tolerate pulmonary toileting this shift  Outcome: Progressing  Goal: Verbalize decreased shortness of breath this shift  Outcome: Progressing  Goal: Wean oxygen to maintain O2 saturation per order/standard this shift  Outcome: Progressing    The clinical goals for the shift include wean oxygen as tolerated, improve oxygenation

## 2024-09-30 NOTE — H&P
History Of Present Illness  Dallin Crawford is a 64 y.o. male presenting with shortness of breath.  He presented to the emergency room secondary to shortness of breath.  He states that he called the emergency squad because his breathing was quite bad and he could not breathe pretty much at all.  He normally wears 2 L of oxygen via nasal cannula  He states he has also been having increase sputum production with greenish sputum which is out of characteristic for him.  He still smokes about half a pack of cigarettes per day however today he only smoked 2 cigarettes.  He also did not eat at all today because he was so short of breath that he could not eat  He denies fevers chills or sweats  He denies any chest pain  Since arriving here in the emergency room he is feeling much better.  His breathing is much better he is now on 4 L of oxygen which is up from 2 L which is his normal baseline  His mother and sister are at bedside with him  I did discuss with all of them his CODE STATUS  Past Medical History  He has a past medical history of Acquired partial absence of pancreas (11/12/2019), Nicotine dependence, cigarettes, uncomplicated, Other specified abnormal findings of blood chemistry (11/18/2019), and Personal history of other specified conditions (07/20/2020).    Surgical History  He has a past surgical history that includes Other surgical history (11/12/2019); Other surgical history (11/12/2019); and Other surgical history (11/18/2019).     Social History  He reports that he has been smoking cigarettes. He started smoking about 47 years ago. He has a 23.9 pack-year smoking history. He has never used smokeless tobacco. He reports that he does not currently use alcohol. He reports that he does not use drugs.    Family History  Family History   Problem Relation Name Age of Onset    Cancer Father      Melanoma Father      Breast cancer Sister          Allergies  Patient has no known allergies.    Review of  Systems  A full 10 point review of systems was obtained is negative except HPI as above  Physical Exam  Constitutional:       Appearance: Normal appearance.      Comments: Very thin   HENT:      Head: Normocephalic and atraumatic.      Right Ear: External ear normal.      Left Ear: External ear normal.      Nose: Nose normal.      Mouth/Throat:      Mouth: Mucous membranes are moist.      Pharynx: Oropharynx is clear.   Eyes:      Extraocular Movements: Extraocular movements intact.      Conjunctiva/sclera: Conjunctivae normal.      Pupils: Pupils are equal, round, and reactive to light.   Cardiovascular:      Rate and Rhythm: Normal rate and regular rhythm.   Pulmonary:      Effort: Pulmonary effort is normal.      Comments: He has diminished breath sounds pretty much globally.  At this point there is no wheezing  He does have a barrel chest  Abdominal:      General: Abdomen is flat.      Palpations: Abdomen is soft.   Skin:     General: Skin is warm and dry.   Neurological:      General: No focal deficit present.      Mental Status: He is alert and oriented to person, place, and time.   Psychiatric:         Mood and Affect: Mood normal.         Behavior: Behavior normal.          Last Recorded Vitals  /80   Pulse (!) 110   Temp 37.7 °C (99.9 °F) (Temporal)   Resp 20   Wt 48.1 kg (106 lb)   SpO2 94%     Relevant Results        Results reviewed     Assessment/Plan   Assessment & Plan  Pneumonia of both lower lobes due to infectious organism    Centrilobular emphysema (Multi)    Chronic pancreatitis (Multi)    Lung nodules    Thrombocytopenia (CMS-HCC)    COPD exacerbation (Multi)      COPD with exacerbation  Centrilobular emphysema  Community-acquired pneumonia with infiltrates  Nicotine abuse  Chronic hypoxemic respiratory failure  Chronic hypercapnia  Leukocytosis    Plan:  At this time he will be admitted to the hospital.  He is already feeling better which is a good sign  We will work to obtain a  sputum culture  Continue oxygen to support his respirations as needed  Christianoo[place]emichael  Levaquin to cover for community-acquired pneumonia  Oral steroids  We did discuss nicotine abstinence  I also discussed with he and his family his CODE STATUS  After discussing CODE STATUS with him he stated at first that he did not want to be put on a ventilator  However in discussion with he and his family he would be okay with short-term ventilation and that his family would decide whether he would stay on it long-term if that was needed.  Due to this at this point he is a full code         Ji Poole, DO

## 2024-09-30 NOTE — CONSULTS
Reason For Consult  COPD Education    Patient Characteristics: Pt. Sitting up in chair on 4L NC. Pt. Slightly winded when talking.   Comorbidities:                                            Exacerbation last year:                    Moderate: >= 2 exacerbations or >= 1 exacerbation leading to hospitalization    Spirometry: yes   Date: 4/12/21            FVC: 3.10   ( 67   %) FEV1: 1.07   ( 30  %)  FEV1/FVC: 35   ( 45  %)    Uses of Oxygen/CPAP/BIPAP: Pt. Has 2L of oxygen at home . Pt. Has only been wearing this oxygen at night. Pt. States 'I have never been told I need it any other time.                                                                 Smoking status:  Smoker: Current      PPY: 23PPY     Quit date:      Smoking cessation counseling: yes pt. And I discussed the benefits of quitting smoking. Pt. And I discussed the freedom from smoking program. Pt. Has been trying to cut back on his own he is down to 1/2ppd from 1ppd. Pt. Has quit smoking in the past for about 3 months after he had his surgery. Pt. Would like a smoking patch while here at the hospital, and upon discharge.    Booklet given:  Handout and contact information given.            Pulmonary physician:  Pt. Was following Dr. Whitfield but he has switched to a a Pulmonary Dr. In [facility] his Apt. Is for 10/21/24                    Name:                   Date last seen:                                     Pharmacotherapy:   Maintenance medications Breztri  LABA, LAMA, LABA/LAMA, ICS/LABA, ICS/LAMA, ICS/LABA/LAMA,   Name of the medication: Albuterol Rescue inhaler    Prednisone: No Theophylline: No  Roflumilast: No  Macrolides: No     If not on maintenance meds:  Consider LAMA or LAMA/LABA   Consider ICS (if peripheral eosinophilia >300cells/microl, COPD exacerbation requiring hospitalization, >= 2 moderated COPD exacerbation, History of or concomitant asthma)    If low inspiratory force or challenges with inhalers   Consider Nebulizers   Consider  RESPIMAT      COPD Education   COPD patient education book: Yes, Smoking, Dyspnea Cycle, COPD Action Plan     Inhaled medication teach-back: Yes     CECY, other videos:      Patient demonstrated understanding/teach back method: Yes         Home Oxygen Evaluation: Pt. WILL need a home oxygen assessment prior to discharge.                                                  Pulmonary Rehabilitation referral:  Already attended:       When:                                Info in COPD patient education book              Vaccines                    Influenza:          Pneumococcal:       COVID 19:      Pertussis:                    Recommendations:   Follow up with pulmonary within two weeks of discharge may benefit. Home oxygen Assessment.         Beatriz Malcolm, RRT

## 2024-09-30 NOTE — PROGRESS NOTES
09/30/24 1555   Discharge Planning   Living Arrangements Alone   Support Systems Children;Parent;Family members   Assistance Needed none   Type of Residence Private residence   Number of Stairs to Enter Residence 3   Number of Stairs Within Residence 0   Do you have animals or pets at home? No   Who is requesting discharge planning? Provider   Home or Post Acute Services None   Expected Discharge Disposition Home   Does the patient need discharge transport arranged? No   Financial Resource Strain   How hard is it for you to pay for the very basics like food, housing, medical care, and heating? Not hard   Housing Stability   In the last 12 months, was there a time when you were not able to pay the mortgage or rent on time? N   In the past 12 months, how many times have you moved where you were living? 1   At any time in the past 12 months, were you homeless or living in a shelter (including now)? N   Transportation Needs   In the past 12 months, has lack of transportation kept you from medical appointments or from getting medications? no   In the past 12 months, has lack of transportation kept you from meetings, work, or from getting things needed for daily living? No     1600 Spoke with pt via phone and verified address, phone number and emergency contact information. PCP is Dr Delong last seen in August and preferred pharmacy is [facility], denies issues obtaining or affording medications and takes as ordered. Pt is independent, lives at home alone and feels safe. Pt uses oxygen from [employer] when he came to the hospital it was 2L at HS but he is now on 4L. AMPAC is 24/24 per nursing. ADOD is 48hrs per care rounds. CT to follow. Silvina Combs BSN/RN-TCC

## 2024-10-01 LAB
ANION GAP SERPL CALC-SCNC: 8 MMOL/L (ref 10–20)
ATRIAL RATE: 120 BPM
BUN SERPL-MCNC: 12 MG/DL (ref 6–23)
CALCIUM SERPL-MCNC: 9.1 MG/DL (ref 8.6–10.3)
CHLORIDE SERPL-SCNC: 99 MMOL/L (ref 98–107)
CO2 SERPL-SCNC: 33 MMOL/L (ref 21–32)
CREAT SERPL-MCNC: 0.57 MG/DL (ref 0.5–1.3)
EGFRCR SERPLBLD CKD-EPI 2021: >90 ML/MIN/1.73M*2
ERYTHROCYTE [DISTWIDTH] IN BLOOD BY AUTOMATED COUNT: 12.6 % (ref 11.5–14.5)
GLUCOSE SERPL-MCNC: 139 MG/DL (ref 74–99)
HCT VFR BLD AUTO: 41.2 % (ref 41–52)
HGB BLD-MCNC: 13.4 G/DL (ref 13.5–17.5)
MCH RBC QN AUTO: 29.8 PG (ref 26–34)
MCHC RBC AUTO-ENTMCNC: 32.5 G/DL (ref 32–36)
MCV RBC AUTO: 92 FL (ref 80–100)
NRBC BLD-RTO: 0 /100 WBCS (ref 0–0)
P AXIS: 80 DEGREES
P OFFSET: 207 MS
P ONSET: 165 MS
PLATELET # BLD AUTO: 132 X10*3/UL (ref 150–450)
POTASSIUM SERPL-SCNC: 4 MMOL/L (ref 3.5–5.3)
PR INTERVAL: 122 MS
Q ONSET: 226 MS
QRS COUNT: 20 BEATS
QRS DURATION: 64 MS
QT INTERVAL: 300 MS
QTC CALCULATION(BAZETT): 424 MS
QTC FREDERICIA: 378 MS
R AXIS: 85 DEGREES
RBC # BLD AUTO: 4.5 X10*6/UL (ref 4.5–5.9)
SODIUM SERPL-SCNC: 136 MMOL/L (ref 136–145)
T AXIS: 75 DEGREES
T OFFSET: 376 MS
VENTRICULAR RATE: 120 BPM
WBC # BLD AUTO: 12.3 X10*3/UL (ref 4.4–11.3)

## 2024-10-01 PROCEDURE — 36415 COLL VENOUS BLD VENIPUNCTURE: CPT | Performed by: HOSPITALIST

## 2024-10-01 PROCEDURE — 2500000002 HC RX 250 W HCPCS SELF ADMINISTERED DRUGS (ALT 637 FOR MEDICARE OP, ALT 636 FOR OP/ED): Performed by: INTERNAL MEDICINE

## 2024-10-01 PROCEDURE — 2500000002 HC RX 250 W HCPCS SELF ADMINISTERED DRUGS (ALT 637 FOR MEDICARE OP, ALT 636 FOR OP/ED): Performed by: NURSE PRACTITIONER

## 2024-10-01 PROCEDURE — 94640 AIRWAY INHALATION TREATMENT: CPT

## 2024-10-01 PROCEDURE — S4991 NICOTINE PATCH NONLEGEND: HCPCS | Performed by: NURSE PRACTITIONER

## 2024-10-01 PROCEDURE — 80048 BASIC METABOLIC PNL TOTAL CA: CPT | Performed by: HOSPITALIST

## 2024-10-01 PROCEDURE — 85027 COMPLETE CBC AUTOMATED: CPT | Performed by: HOSPITALIST

## 2024-10-01 PROCEDURE — 94760 N-INVAS EAR/PLS OXIMETRY 1: CPT

## 2024-10-01 PROCEDURE — 99233 SBSQ HOSP IP/OBS HIGH 50: CPT | Performed by: HOSPITALIST

## 2024-10-01 PROCEDURE — 94761 N-INVAS EAR/PLS OXIMETRY MLT: CPT

## 2024-10-01 PROCEDURE — 1100000001 HC PRIVATE ROOM DAILY

## 2024-10-01 PROCEDURE — 94762 N-INVAS EAR/PLS OXIMTRY CONT: CPT

## 2024-10-01 PROCEDURE — 2500000005 HC RX 250 GENERAL PHARMACY W/O HCPCS: Performed by: INTERNAL MEDICINE

## 2024-10-01 PROCEDURE — 2500000004 HC RX 250 GENERAL PHARMACY W/ HCPCS (ALT 636 FOR OP/ED): Performed by: INTERNAL MEDICINE

## 2024-10-01 PROCEDURE — 2500000001 HC RX 250 WO HCPCS SELF ADMINISTERED DRUGS (ALT 637 FOR MEDICARE OP): Performed by: INTERNAL MEDICINE

## 2024-10-01 RX ADMIN — Medication 4 L/MIN: at 18:02

## 2024-10-01 RX ADMIN — NICOTINE 1 PATCH: 14 PATCH, EXTENDED RELEASE TRANSDERMAL at 08:10

## 2024-10-01 RX ADMIN — Medication 4 L/MIN: at 06:05

## 2024-10-01 RX ADMIN — Medication 4 L/MIN: at 11:34

## 2024-10-01 RX ADMIN — IPRATROPIUM BROMIDE AND ALBUTEROL SULFATE 3 ML: .5; 3 SOLUTION RESPIRATORY (INHALATION) at 11:34

## 2024-10-01 RX ADMIN — IPRATROPIUM BROMIDE AND ALBUTEROL SULFATE 3 ML: .5; 3 SOLUTION RESPIRATORY (INHALATION) at 23:16

## 2024-10-01 RX ADMIN — PREDNISONE 40 MG: 20 TABLET ORAL at 08:10

## 2024-10-01 RX ADMIN — Medication 3 L/MIN: at 23:17

## 2024-10-01 RX ADMIN — LEVOFLOXACIN 750 MG: 750 TABLET, FILM COATED ORAL at 08:10

## 2024-10-01 RX ADMIN — PANCRELIPASE 2 CAPSULE: 36000; 180000; 114000 CAPSULE, DELAYED RELEASE PELLETS ORAL at 11:46

## 2024-10-01 RX ADMIN — IPRATROPIUM BROMIDE AND ALBUTEROL SULFATE 3 ML: .5; 3 SOLUTION RESPIRATORY (INHALATION) at 06:05

## 2024-10-01 RX ADMIN — PANCRELIPASE 2 CAPSULE: 36000; 180000; 114000 CAPSULE, DELAYED RELEASE PELLETS ORAL at 08:10

## 2024-10-01 RX ADMIN — PANCRELIPASE 2 CAPSULE: 36000; 180000; 114000 CAPSULE, DELAYED RELEASE PELLETS ORAL at 17:05

## 2024-10-01 RX ADMIN — IPRATROPIUM BROMIDE AND ALBUTEROL SULFATE 3 ML: .5; 3 SOLUTION RESPIRATORY (INHALATION) at 18:02

## 2024-10-01 ASSESSMENT — COGNITIVE AND FUNCTIONAL STATUS - GENERAL
MOBILITY SCORE: 24
DAILY ACTIVITIY SCORE: 24

## 2024-10-01 ASSESSMENT — PAIN SCALES - GENERAL
PAINLEVEL_OUTOF10: 0 - NO PAIN

## 2024-10-01 ASSESSMENT — PAIN - FUNCTIONAL ASSESSMENT
PAIN_FUNCTIONAL_ASSESSMENT: 0-10

## 2024-10-01 ASSESSMENT — ACTIVITIES OF DAILY LIVING (ADL): LACK_OF_TRANSPORTATION: NO

## 2024-10-01 NOTE — PROGRESS NOTES
"Dallin Crawford is a 64 y.o. male on day 2 of admission presenting with Pneumonia of both lower lobes due to infectious organism.    Subjective   Shortness of breath better than yesterday         Objective     Physical Exam  General Appearance: AAO x 3,   Skin: skin color pink, warm, and dry; no suspicious rashes or lesions  Eyes : PERRL, EOM's intact  ENT: mucous membranes pink and moist  Neck: normocephalic  Respiratory: Diminished breath sounds  Heart: regular rate and rhythm.  Abdomen: Nondistended, positive bowel sounds x4, soft,  nontender  Extremities: no edema   Peripheral pulses: normal x4 extremities  Neuro: alert, coherent and conversant, no focal motor deficits  Last Recorded Vitals  Blood pressure 126/68, pulse 89, temperature 36.7 °C (98.1 °F), temperature source Temporal, resp. rate 20, height 1.753 m (5' 9\"), weight 49.9 kg (110 lb), SpO2 93%.  Intake/Output last 3 Shifts:  I/O last 3 completed shifts:  In: 1490 (29.9 mL/kg) [P.O.:1490]  Out: - (0 mL/kg)   Weight: 49.9 kg     Relevant Results    Scheduled medications  enoxaparin, 40 mg, subcutaneous, q24h  ipratropium-albuteroL, 3 mL, nebulization, q6h  levoFLOXacin, 750 mg, oral, q24h JOSE A  nicotine, 1 patch, transdermal, Daily  pancrelipase (Lip-Prot-Amyl), 2 capsule, oral, TID  polyethylene glycol, 17 g, oral, Daily  predniSONE, 40 mg, oral, Daily      Continuous medications     PRN medications  PRN medications: acetaminophen **OR** acetaminophen **OR** acetaminophen, acetaminophen **OR** acetaminophen **OR** acetaminophen, lubricating eye drops, ondansetron ODT **OR** ondansetron, oxygen    Results for orders placed or performed during the hospital encounter of 09/29/24 (from the past 24 hour(s))   CBC   Result Value Ref Range    WBC 12.3 (H) 4.4 - 11.3 x10*3/uL    nRBC 0.0 0.0 - 0.0 /100 WBCs    RBC 4.50 4.50 - 5.90 x10*6/uL    Hemoglobin 13.4 (L) 13.5 - 17.5 g/dL    Hematocrit 41.2 41.0 - 52.0 %    MCV 92 80 - 100 fL    MCH 29.8 26.0 - 34.0 " pg    MCHC 32.5 32.0 - 36.0 g/dL    RDW 12.6 11.5 - 14.5 %    Platelets 132 (L) 150 - 450 x10*3/uL   Basic metabolic panel   Result Value Ref Range    Glucose 139 (H) 74 - 99 mg/dL    Sodium 136 136 - 145 mmol/L    Potassium 4.0 3.5 - 5.3 mmol/L    Chloride 99 98 - 107 mmol/L    Bicarbonate 33 (H) 21 - 32 mmol/L    Anion Gap 8 (L) 10 - 20 mmol/L    Urea Nitrogen 12 6 - 23 mg/dL    Creatinine 0.57 0.50 - 1.30 mg/dL    eGFR >90 >60 mL/min/1.73m*2    Calcium 9.1 8.6 - 10.3 mg/dL                       Assessment/Plan   Assessment & Plan  Pneumonia of both lower lobes due to infectious organism    Centrilobular emphysema (Multi)    Chronic pancreatitis (Multi)    Lung nodules    Thrombocytopenia (CMS-HCC)    COPD exacerbation (Multi)      64-year-old male with  COPD exacerbation  Emphysema  County acquired pneumonia  Nicotine abuse  Chronic hypoxemic respiratory failure  Chronic hypercapnia  Leukocytosis  Plan  Nebs treatments  Prednisone 40 from daily  Improving overall  Still gets short of breath upon ambulation  On supplemental oxygen to wean off as tolerated  Walking pulse ox prior discharge planning  Levaquin for pneumonia  Nicotine patch  Education counseling  Supportive care  Full code  Continue current medicines  Follow vitals  Daily CBC BMP  Lovenox for DVT prophylaxis  Tylenol for fever or pain control  To follow pulmonology outpatient  Follow cultures  Walking pulse ox tomorrow  Possible DC if stable tomorrow, may need home oxygen                          Kem Seaman MD

## 2024-10-01 NOTE — PROGRESS NOTES
10/01/24 1244   Discharge Planning   Living Arrangements Alone   Support Systems Children;Parent;Family members   Assistance Needed none   Type of Residence Private residence   Number of Stairs to Enter Residence 3   Number of Stairs Within Residence 0   Do you have animals or pets at home? No   Who is requesting discharge planning? Provider   Home or Post Acute Services None   Expected Discharge Disposition Home   Financial Resource Strain   How hard is it for you to pay for the very basics like food, housing, medical care, and heating? Not hard   Housing Stability   In the last 12 months, was there a time when you were not able to pay the mortgage or rent on time? N   In the past 12 months, how many times have you moved where you were living? 1   At any time in the past 12 months, were you homeless or living in a shelter (including now)? N   Transportation Needs   In the past 12 months, has lack of transportation kept you from medical appointments or from getting medications? no   In the past 12 months, has lack of transportation kept you from meetings, work, or from getting things needed for daily living? No     Plan remains unchanged and per the care rounds meeting ADOD is 24hrs. AMAP 24/24. CT to follow. Silvina Combs BSN/RN-TCC

## 2024-10-01 NOTE — CARE PLAN
The clinical goals for the shift include wean oxygen as tolerated, improve oxygenation    Over the shift, the patient's oxygen remained at 4 L NC.  Pt states that he is feeling better.  Less SOB noted with activity.

## 2024-10-01 NOTE — PROGRESS NOTES
"Dallin Crawford is a 64 y.o. male on day 1 of admission presenting with Pneumonia of both lower lobes due to infectious organism.    Subjective   Shortness of breath present  No productive cough  No nausea vomiting  thin frail looking  No chest pain  No leg swelling         Objective     Physical Exam  General Appearance: AAO x 3,   Skin: skin color pink, warm, and dry; no suspicious rashes or lesions  Eyes : PERRL, EOM's intact  ENT: mucous membranes pink and moist  Neck: normocephalic  Respiratory: Diminished breath sounds  Heart: regular rate and rhythm.  Abdomen: Nondistended, positive bowel sounds x4, soft,  nontender  Extremities: no edema   Peripheral pulses: normal x4 extremities  Neuro: alert, coherent and conversant, no focal motor deficits  Last Recorded Vitals  Blood pressure 131/79, pulse 75, temperature 36.4 °C (97.5 °F), temperature source Temporal, resp. rate 17, height 1.753 m (5' 9\"), weight 49.9 kg (110 lb), SpO2 (!) 89%.  Intake/Output last 3 Shifts:  I/O last 3 completed shifts:  In: 760 (15.2 mL/kg) [P.O.:760]  Out: - (0 mL/kg)   Weight: 49.9 kg     Relevant Results    Scheduled medications  enoxaparin, 40 mg, subcutaneous, q24h  ipratropium-albuteroL, 3 mL, nebulization, q6h  levoFLOXacin, 750 mg, oral, q24h JOSE A  nicotine, 1 patch, transdermal, Daily  pancrelipase (Lip-Prot-Amyl), 2 capsule, oral, TID  polyethylene glycol, 17 g, oral, Daily  predniSONE, 40 mg, oral, Daily      Continuous medications     PRN medications  PRN medications: acetaminophen **OR** acetaminophen **OR** acetaminophen, acetaminophen **OR** acetaminophen **OR** acetaminophen, ondansetron ODT **OR** ondansetron, oxygen    Results for orders placed or performed during the hospital encounter of 09/29/24 (from the past 24 hour(s))   Urinalysis with Reflex Culture and Microscopic   Result Value Ref Range    Color, Urine Light-Yellow Light-Yellow, Yellow, Dark-Yellow    Appearance, Urine Clear Clear    Specific Gravity, " Urine 1.014 1.005 - 1.035    pH, Urine 6.0 5.0, 5.5, 6.0, 6.5, 7.0, 7.5, 8.0    Protein, Urine 20 (TRACE) NEGATIVE, 10 (TRACE), 20 (TRACE) mg/dL    Glucose, Urine 500 (3+) (A) Normal mg/dL    Blood, Urine 1.0 (3+) (A) NEGATIVE    Ketones, Urine 10 (1+) (A) NEGATIVE mg/dL    Bilirubin, Urine NEGATIVE NEGATIVE    Urobilinogen, Urine Normal Normal mg/dL    Nitrite, Urine NEGATIVE NEGATIVE    Leukocyte Esterase, Urine NEGATIVE NEGATIVE   Extra Urine Gray Tube   Result Value Ref Range    Extra Tube Hold for add-ons.    Urinalysis Microscopic   Result Value Ref Range    WBC, Urine 1-5 1-5, NONE /HPF    RBC, Urine >20 (A) NONE, 1-2, 3-5 /HPF    Bacteria, Urine 1+ (A) NONE SEEN /HPF    Mucus, Urine FEW Reference range not established. /LPF   CBC   Result Value Ref Range    WBC 17.1 (H) 4.4 - 11.3 x10*3/uL    nRBC 0.0 0.0 - 0.0 /100 WBCs    RBC 4.77 4.50 - 5.90 x10*6/uL    Hemoglobin 14.3 13.5 - 17.5 g/dL    Hematocrit 43.2 41.0 - 52.0 %    MCV 91 80 - 100 fL    MCH 30.0 26.0 - 34.0 pg    MCHC 33.1 32.0 - 36.0 g/dL    RDW 12.7 11.5 - 14.5 %    Platelets 146 (L) 150 - 450 x10*3/uL   Basic metabolic panel   Result Value Ref Range    Glucose 205 (H) 74 - 99 mg/dL    Sodium 138 136 - 145 mmol/L    Potassium 4.5 3.5 - 5.3 mmol/L    Chloride 102 98 - 107 mmol/L    Bicarbonate 30 21 - 32 mmol/L    Anion Gap 11 10 - 20 mmol/L    Urea Nitrogen 13 6 - 23 mg/dL    Creatinine 0.65 0.50 - 1.30 mg/dL    eGFR >90 >60 mL/min/1.73m*2    Calcium 8.9 8.6 - 10.3 mg/dL   Respiratory Culture/Smear    Specimen: SPUTUM; Fluid   Result Value Ref Range    Respiratory Culture/Smear (A)      Culture not performed. See Gram stain findings. Recollect if clinically indicated.    Gram Stain (A)      Gram stain indicates specimen contains significant salivary contamination.    Gram Stain (A)      A specimen of better quality should be submitted if clinically indicated.   SST TOP   Result Value Ref Range    Extra Tube Hold for add-ons.                   ECG  12 Lead    Result Date: 9/30/2024  Sinus tachycardia with occasional Premature ventricular complexes Right atrial enlargement Junctional ST depression, probably normal Borderline ECG When compared with ECG of 16-SEP-2024 16:00, (unconfirmed) Sinus rhythm has replaced Atrial fibrillation Vent. rate has increased BY  55 BPM Left bundle branch block is no longer Present    XR chest 1 view    Result Date: 9/29/2024  Interpreted By:  Mejia Whalen, STUDY: XR CHEST 1 VIEW;  9/29/2024 6:36 pm   INDICATION: Signs/Symptoms:Shortness of breath.     COMPARISON: 05/02/2022   ACCESSION NUMBER(S): [id number]   ORDERING CLINICIAN: ERA NORIEGA   FINDINGS:         CARDIOMEDIASTINAL SILHOUETTE: Cardiomediastinal silhouette is normal in size and configuration.   LUNGS: The lungs are markedly hyperinflated   There is patchy airspace disease at the bases bilaterally, worsened from the prior. Small pleural effusion versus pleural thickening bilaterally as well. There is no pneumothorax.   ABDOMEN: No remarkable upper abdominal findings.   BONES: No acute osseous changes.       1. Patchy bibasilar airspace disease concerning for pneumonia. Radiographic follow-up to resolution in 3-4 weeks is advised 2. Advanced emphysematous changes.       MACRO: None   Signed by: Mejia Whalen 9/29/2024 6:37 PM Dictation workstation:   [id number]              Assessment/Plan   Assessment & Plan  Pneumonia of both lower lobes due to infectious organism    Centrilobular emphysema (Multi)    Chronic pancreatitis (Multi)    Lung nodules    Thrombocytopenia (CMS-HCC)    COPD exacerbation (Multi)      64-year-old male with  COPD exacerbation  Emphysema  Community-acquired pneumonia  Nicotine abuse  Chronic hypoxemic respiratory failure  Chronic hypercapnia  Leukocytosis  Plan  Neb treatments  Prednisone 40 mg p.o. daily  Feels better than yesterday  Still gets winded upon walking few steps  On supplemental oxygen to wean off as  tolerated  Levaquin for pneumonia  Nicotine patch  Education counseling  Advised to quit smoking  Supportive care  Full code  Continue current medicines  Follow vitals  Daily CBC BMP  Lovenox for DVT prophylaxis  Tylenol for fever or pain control  Disposition when stable clinically  To follow pulmonology outpatient  Follow cultures                    Kem Seaman MD

## 2024-10-02 LAB
ANION GAP SERPL CALC-SCNC: 7 MMOL/L (ref 10–20)
BUN SERPL-MCNC: 11 MG/DL (ref 6–23)
CALCIUM SERPL-MCNC: 9.2 MG/DL (ref 8.6–10.3)
CHLORIDE SERPL-SCNC: 97 MMOL/L (ref 98–107)
CO2 SERPL-SCNC: 36 MMOL/L (ref 21–32)
CREAT SERPL-MCNC: 0.61 MG/DL (ref 0.5–1.3)
EGFRCR SERPLBLD CKD-EPI 2021: >90 ML/MIN/1.73M*2
ERYTHROCYTE [DISTWIDTH] IN BLOOD BY AUTOMATED COUNT: 12.4 % (ref 11.5–14.5)
GLUCOSE SERPL-MCNC: 134 MG/DL (ref 74–99)
HCT VFR BLD AUTO: 41.8 % (ref 41–52)
HGB BLD-MCNC: 13.6 G/DL (ref 13.5–17.5)
MCH RBC QN AUTO: 29.7 PG (ref 26–34)
MCHC RBC AUTO-ENTMCNC: 32.5 G/DL (ref 32–36)
MCV RBC AUTO: 91 FL (ref 80–100)
NRBC BLD-RTO: 0 /100 WBCS (ref 0–0)
PLATELET # BLD AUTO: 127 X10*3/UL (ref 150–450)
POTASSIUM SERPL-SCNC: 4.3 MMOL/L (ref 3.5–5.3)
RBC # BLD AUTO: 4.58 X10*6/UL (ref 4.5–5.9)
SODIUM SERPL-SCNC: 136 MMOL/L (ref 136–145)
WBC # BLD AUTO: 10.7 X10*3/UL (ref 4.4–11.3)

## 2024-10-02 PROCEDURE — 2500000001 HC RX 250 WO HCPCS SELF ADMINISTERED DRUGS (ALT 637 FOR MEDICARE OP): Performed by: INTERNAL MEDICINE

## 2024-10-02 PROCEDURE — 36415 COLL VENOUS BLD VENIPUNCTURE: CPT | Performed by: HOSPITALIST

## 2024-10-02 PROCEDURE — 94667 MNPJ CHEST WALL 1ST: CPT

## 2024-10-02 PROCEDURE — 2500000002 HC RX 250 W HCPCS SELF ADMINISTERED DRUGS (ALT 637 FOR MEDICARE OP, ALT 636 FOR OP/ED): Performed by: INTERNAL MEDICINE

## 2024-10-02 PROCEDURE — S4991 NICOTINE PATCH NONLEGEND: HCPCS | Performed by: NURSE PRACTITIONER

## 2024-10-02 PROCEDURE — 82374 ASSAY BLOOD CARBON DIOXIDE: CPT | Performed by: HOSPITALIST

## 2024-10-02 PROCEDURE — 87040 BLOOD CULTURE FOR BACTERIA: CPT | Mod: SAMLAB | Performed by: HOSPITALIST

## 2024-10-02 PROCEDURE — 2500000005 HC RX 250 GENERAL PHARMACY W/O HCPCS: Performed by: INTERNAL MEDICINE

## 2024-10-02 PROCEDURE — 2500000004 HC RX 250 GENERAL PHARMACY W/ HCPCS (ALT 636 FOR OP/ED): Performed by: INTERNAL MEDICINE

## 2024-10-02 PROCEDURE — 94761 N-INVAS EAR/PLS OXIMETRY MLT: CPT

## 2024-10-02 PROCEDURE — 2500000002 HC RX 250 W HCPCS SELF ADMINISTERED DRUGS (ALT 637 FOR MEDICARE OP, ALT 636 FOR OP/ED): Performed by: NURSE PRACTITIONER

## 2024-10-02 PROCEDURE — 85027 COMPLETE CBC AUTOMATED: CPT | Performed by: HOSPITALIST

## 2024-10-02 PROCEDURE — 94668 MNPJ CHEST WALL SBSQ: CPT

## 2024-10-02 PROCEDURE — 94640 AIRWAY INHALATION TREATMENT: CPT

## 2024-10-02 PROCEDURE — 99233 SBSQ HOSP IP/OBS HIGH 50: CPT | Performed by: HOSPITALIST

## 2024-10-02 PROCEDURE — 1100000001 HC PRIVATE ROOM DAILY

## 2024-10-02 RX ADMIN — PREDNISONE 40 MG: 20 TABLET ORAL at 08:12

## 2024-10-02 RX ADMIN — Medication 3 L/MIN: at 19:02

## 2024-10-02 RX ADMIN — LEVOFLOXACIN 750 MG: 750 TABLET, FILM COATED ORAL at 08:13

## 2024-10-02 RX ADMIN — POLYETHYLENE GLYCOL 3350 17 G: 17 POWDER, FOR SOLUTION ORAL at 08:13

## 2024-10-02 RX ADMIN — Medication 3 L/MIN: at 23:11

## 2024-10-02 RX ADMIN — IPRATROPIUM BROMIDE AND ALBUTEROL SULFATE 3 ML: .5; 3 SOLUTION RESPIRATORY (INHALATION) at 18:56

## 2024-10-02 RX ADMIN — IPRATROPIUM BROMIDE AND ALBUTEROL SULFATE 3 ML: .5; 3 SOLUTION RESPIRATORY (INHALATION) at 06:06

## 2024-10-02 RX ADMIN — PANCRELIPASE 2 CAPSULE: 36000; 180000; 114000 CAPSULE, DELAYED RELEASE PELLETS ORAL at 11:51

## 2024-10-02 RX ADMIN — IPRATROPIUM BROMIDE AND ALBUTEROL SULFATE 3 ML: .5; 3 SOLUTION RESPIRATORY (INHALATION) at 23:11

## 2024-10-02 RX ADMIN — IPRATROPIUM BROMIDE AND ALBUTEROL SULFATE 3 ML: .5; 3 SOLUTION RESPIRATORY (INHALATION) at 11:24

## 2024-10-02 RX ADMIN — Medication 3 L/MIN: at 06:07

## 2024-10-02 RX ADMIN — PANCRELIPASE 2 CAPSULE: 36000; 180000; 114000 CAPSULE, DELAYED RELEASE PELLETS ORAL at 08:13

## 2024-10-02 RX ADMIN — NICOTINE 1 PATCH: 14 PATCH, EXTENDED RELEASE TRANSDERMAL at 08:13

## 2024-10-02 RX ADMIN — Medication 3 L/MIN: at 11:24

## 2024-10-02 RX ADMIN — Medication 3 L/MIN: at 19:34

## 2024-10-02 RX ADMIN — PANCRELIPASE 2 CAPSULE: 36000; 180000; 114000 CAPSULE, DELAYED RELEASE PELLETS ORAL at 17:39

## 2024-10-02 ASSESSMENT — PAIN SCALES - GENERAL
PAINLEVEL_OUTOF10: 0 - NO PAIN

## 2024-10-02 ASSESSMENT — COGNITIVE AND FUNCTIONAL STATUS - GENERAL
DAILY ACTIVITIY SCORE: 24
DAILY ACTIVITIY SCORE: 24
MOBILITY SCORE: 24
MOBILITY SCORE: 24

## 2024-10-02 ASSESSMENT — PAIN - FUNCTIONAL ASSESSMENT
PAIN_FUNCTIONAL_ASSESSMENT: 0-10

## 2024-10-02 ASSESSMENT — ACTIVITIES OF DAILY LIVING (ADL): LACK_OF_TRANSPORTATION: NO

## 2024-10-02 NOTE — PROGRESS NOTES
"Dallin Crawford is a 64 y.o. male on day 3 of admission presenting with Pneumonia of both lower lobes due to infectious organism.    Subjective   Shortness of breath still present  Gets more winded upon ambulation and oxygen requirements go up       Objective     Physical Exam  General Appearance: AAO x 3,   Skin: skin color pink, warm, and dry; no suspicious rashes or lesions  Eyes : PERRL, EOM's intact  ENT: mucous membranes pink and moist  Neck: normocephalic  Respiratory: Diminished breath sounds  Heart: regular rate and rhythm.  Abdomen: Nondistended, positive bowel sounds x4, soft,  nontender  Extremities: no edema   Peripheral pulses: normal x4 extremities  Neuro: alert, coherent and conversant, no focal motor deficits  Last Recorded Vitals  Blood pressure 154/79, pulse 88, temperature 37.1 °C (98.7 °F), temperature source Temporal, resp. rate 20, height 1.753 m (5' 9\"), weight 49.9 kg (110 lb), SpO2 91%.  Intake/Output last 3 Shifts:  I/O last 3 completed shifts:  In: 1860 (37.3 mL/kg) [P.O.:1860]  Out: 1000 (20 mL/kg) [Urine:1000 (0.6 mL/kg/hr)]  Weight: 49.9 kg     Relevant Results    Scheduled medications  enoxaparin, 40 mg, subcutaneous, q24h  ipratropium-albuteroL, 3 mL, nebulization, q6h  levoFLOXacin, 750 mg, oral, q24h JOSE A  nicotine, 1 patch, transdermal, Daily  pancrelipase (Lip-Prot-Amyl), 2 capsule, oral, TID  polyethylene glycol, 17 g, oral, Daily  predniSONE, 40 mg, oral, Daily      Continuous medications     PRN medications  PRN medications: acetaminophen **OR** acetaminophen **OR** acetaminophen, acetaminophen **OR** acetaminophen **OR** acetaminophen, lubricating eye drops, ondansetron ODT **OR** ondansetron, oxygen    Results for orders placed or performed during the hospital encounter of 09/29/24 (from the past 24 hour(s))   CBC   Result Value Ref Range    WBC 10.7 4.4 - 11.3 x10*3/uL    nRBC 0.0 0.0 - 0.0 /100 WBCs    RBC 4.58 4.50 - 5.90 x10*6/uL    Hemoglobin 13.6 13.5 - 17.5 g/dL    " Hematocrit 41.8 41.0 - 52.0 %    MCV 91 80 - 100 fL    MCH 29.7 26.0 - 34.0 pg    MCHC 32.5 32.0 - 36.0 g/dL    RDW 12.4 11.5 - 14.5 %    Platelets 127 (L) 150 - 450 x10*3/uL   Basic metabolic panel   Result Value Ref Range    Glucose 134 (H) 74 - 99 mg/dL    Sodium 136 136 - 145 mmol/L    Potassium 4.3 3.5 - 5.3 mmol/L    Chloride 97 (L) 98 - 107 mmol/L    Bicarbonate 36 (H) 21 - 32 mmol/L    Anion Gap 7 (L) 10 - 20 mmol/L    Urea Nitrogen 11 6 - 23 mg/dL    Creatinine 0.61 0.50 - 1.30 mg/dL    eGFR >90 >60 mL/min/1.73m*2    Calcium 9.2 8.6 - 10.3 mg/dL                             Assessment/Plan   Assessment & Plan  Pneumonia of both lower lobes due to infectious organism    Centrilobular emphysema (Multi)    Chronic pancreatitis (Multi)    Lung nodules    Thrombocytopenia (CMS-HCC)    COPD exacerbation (Multi)    64-year-old male with  COPD exacerbation  Emphysema  Community-acquired pneumonia  Nicotine abuse  Chronic hypoxemic respiratory failure  Chronic hypercapnia  Leukocytosis  Acute on chronic hypoxemic/hypercapnic respiratory failure  Plan  Neb treatments  Prednisone 40 g daily  Improving somewhat  Still desaturating on ambulation and oxygen requirements were 4 L nasal cannula  At rest improved to 2 L nasal cannula  Pulse ox on ambulation tomorrow, DC planning with likely oxygen in decent range  Continue current medicines  On Levaquin  1 blood culture was positive, second bottle no growth, repeat blood cultures  Likely contamination  Will hold off antibiotics unless showing signs of sepsis and fever  Supportive care  Daily CBC BMP  Reevaluated, discussed with RT  Continue current management  Lovenox for DVT prophylaxis  Nicotine patch  To wean off supplemental oxygen as tolerated                              Kem Seaman MD

## 2024-10-02 NOTE — CARE PLAN
The patient's goals for the shift include      The clinical goals for the shift include Patient will have adequate oxygenation this shift.

## 2024-10-02 NOTE — CARE PLAN
Problem: Nutrition  Goal: Less than 5 days NPO/clear liquids  Outcome: Progressing  Goal: Oral intake greater than 50%  Outcome: Progressing  Goal: Oral intake greater 75%  Outcome: Progressing  Goal: Consume prescribed supplement  Outcome: Progressing  Goal: Adequate PO fluid intake  Outcome: Progressing  Goal: Nutrition support goals are met within 48 hrs  Outcome: Progressing  Goal: Nutrition support is meeting 75% of nutrient needs  Outcome: Progressing  Goal: Tube feed tolerance  Outcome: Progressing  Goal: BG  mg/dL  Outcome: Progressing  Goal: Lab values WNL  Outcome: Progressing  Goal: Electrolytes WNL  Outcome: Progressing  Goal: Promote healing  Outcome: Progressing  Goal: Maintain stable weight  Outcome: Progressing  Goal: Reduce weight from edema/fluid  Outcome: Progressing  Goal: Gradual weight gain  Outcome: Progressing  Goal: Improve ostomy output  Outcome: Progressing     Problem: Respiratory  Goal: Clear secretions with interventions this shift  Outcome: Progressing  Goal: No signs of respiratory distress (eg. Use of accessory muscles. Peds grunting)  Outcome: Progressing  Goal: Tolerate pulmonary toileting this shift  Outcome: Progressing  Goal: Verbalize decreased shortness of breath this shift  Outcome: Progressing  Goal: Wean oxygen to maintain O2 saturation per order/standard this shift  Outcome: Progressing   The patient's goals for the shift include      The clinical goals for the shift include Patient will have adequate oxygenation this shift.

## 2024-10-02 NOTE — PROGRESS NOTES
10/02/24 1120   Discharge Planning   Living Arrangements Alone   Support Systems Children;Parent   Assistance Needed oxygen, needs walker and shower chair   Type of Residence Private residence   Number of Stairs to Enter Residence 3   Number of Stairs Within Residence 0   Do you have animals or pets at home? No   Who is requesting discharge planning? Provider   Home or Post Acute Services None   Expected Discharge Disposition Home   Does the patient need discharge transport arranged? No   Financial Resource Strain   How hard is it for you to pay for the very basics like food, housing, medical care, and heating? Not hard   Housing Stability   In the last 12 months, was there a time when you were not able to pay the mortgage or rent on time? N   In the past 12 months, how many times have you moved where you were living? 1   At any time in the past 12 months, were you homeless or living in a shelter (including now)? N   Transportation Needs   In the past 12 months, has lack of transportation kept you from medical appointments or from getting medications? no   In the past 12 months, has lack of transportation kept you from meetings, work, or from getting things needed for daily living? No     1120 Met with pt and discussed discharge plan pt still plans to return home only requesting a walker and shower chair. Provided with information on where he may be able to get that a lower cost if unable to get through insurance. However we will discuss and request a script for both from physician at discharge. Suggested to the pt that he call [employer] and start MOW for 1 meal a day if he finds he is too SOB or weak. Pt is very thankful for all of the help and advice. [facility] 24/24. ADOD is 24hrs. Silvina Combs BSN/RN-TCC

## 2024-10-03 ENCOUNTER — HOME HEALTH ADMISSION (OUTPATIENT)
Dept: HOME HEALTH SERVICES | Facility: HOME HEALTH | Age: 64
End: 2024-10-03
Payer: COMMERCIAL

## 2024-10-03 VITALS
HEIGHT: 69 IN | WEIGHT: 110 LBS | BODY MASS INDEX: 16.29 KG/M2 | TEMPERATURE: 98.2 F | OXYGEN SATURATION: 92 % | SYSTOLIC BLOOD PRESSURE: 104 MMHG | HEART RATE: 110 BPM | RESPIRATION RATE: 18 BRPM | DIASTOLIC BLOOD PRESSURE: 68 MMHG

## 2024-10-03 PROBLEM — J18.9 PNEUMONIA OF BOTH LOWER LOBES DUE TO INFECTIOUS ORGANISM: Status: RESOLVED | Noted: 2024-09-29 | Resolved: 2024-10-03

## 2024-10-03 PROBLEM — J44.1 COPD EXACERBATION (MULTI): Status: RESOLVED | Noted: 2024-09-29 | Resolved: 2024-10-03

## 2024-10-03 LAB
ANION GAP SERPL CALC-SCNC: 7 MMOL/L (ref 10–20)
BUN SERPL-MCNC: 11 MG/DL (ref 6–23)
CALCIUM SERPL-MCNC: 9.4 MG/DL (ref 8.6–10.3)
CHLORIDE SERPL-SCNC: 94 MMOL/L (ref 98–107)
CO2 SERPL-SCNC: 40 MMOL/L (ref 21–32)
CREAT SERPL-MCNC: 0.64 MG/DL (ref 0.5–1.3)
EGFRCR SERPLBLD CKD-EPI 2021: >90 ML/MIN/1.73M*2
ERYTHROCYTE [DISTWIDTH] IN BLOOD BY AUTOMATED COUNT: 12.2 % (ref 11.5–14.5)
GLUCOSE SERPL-MCNC: 142 MG/DL (ref 74–99)
HCT VFR BLD AUTO: 43.6 % (ref 41–52)
HGB BLD-MCNC: 14.4 G/DL (ref 13.5–17.5)
MCH RBC QN AUTO: 29.9 PG (ref 26–34)
MCHC RBC AUTO-ENTMCNC: 33 G/DL (ref 32–36)
MCV RBC AUTO: 91 FL (ref 80–100)
NRBC BLD-RTO: 0 /100 WBCS (ref 0–0)
PLATELET # BLD AUTO: 116 X10*3/UL (ref 150–450)
POTASSIUM SERPL-SCNC: 4.4 MMOL/L (ref 3.5–5.3)
RBC # BLD AUTO: 4.82 X10*6/UL (ref 4.5–5.9)
SODIUM SERPL-SCNC: 137 MMOL/L (ref 136–145)
WBC # BLD AUTO: 9.6 X10*3/UL (ref 4.4–11.3)

## 2024-10-03 PROCEDURE — 2500000002 HC RX 250 W HCPCS SELF ADMINISTERED DRUGS (ALT 637 FOR MEDICARE OP, ALT 636 FOR OP/ED): Performed by: NURSE PRACTITIONER

## 2024-10-03 PROCEDURE — 2500000001 HC RX 250 WO HCPCS SELF ADMINISTERED DRUGS (ALT 637 FOR MEDICARE OP): Performed by: INTERNAL MEDICINE

## 2024-10-03 PROCEDURE — 94668 MNPJ CHEST WALL SBSQ: CPT

## 2024-10-03 PROCEDURE — 2500000004 HC RX 250 GENERAL PHARMACY W/ HCPCS (ALT 636 FOR OP/ED): Performed by: INTERNAL MEDICINE

## 2024-10-03 PROCEDURE — S4991 NICOTINE PATCH NONLEGEND: HCPCS | Performed by: NURSE PRACTITIONER

## 2024-10-03 PROCEDURE — 9420000001 HC RT PATIENT EDUCATION 5 MIN

## 2024-10-03 PROCEDURE — 85027 COMPLETE CBC AUTOMATED: CPT | Performed by: HOSPITALIST

## 2024-10-03 PROCEDURE — 94640 AIRWAY INHALATION TREATMENT: CPT

## 2024-10-03 PROCEDURE — 80048 BASIC METABOLIC PNL TOTAL CA: CPT | Performed by: HOSPITALIST

## 2024-10-03 PROCEDURE — 2500000002 HC RX 250 W HCPCS SELF ADMINISTERED DRUGS (ALT 637 FOR MEDICARE OP, ALT 636 FOR OP/ED): Performed by: INTERNAL MEDICINE

## 2024-10-03 PROCEDURE — 36415 COLL VENOUS BLD VENIPUNCTURE: CPT | Performed by: HOSPITALIST

## 2024-10-03 PROCEDURE — 94761 N-INVAS EAR/PLS OXIMETRY MLT: CPT

## 2024-10-03 PROCEDURE — 2500000005 HC RX 250 GENERAL PHARMACY W/O HCPCS: Performed by: INTERNAL MEDICINE

## 2024-10-03 PROCEDURE — 99239 HOSP IP/OBS DSCHRG MGMT >30: CPT | Performed by: STUDENT IN AN ORGANIZED HEALTH CARE EDUCATION/TRAINING PROGRAM

## 2024-10-03 RX ORDER — ALBUTEROL SULFATE 90 UG/1
2 INHALANT RESPIRATORY (INHALATION) EVERY 4 HOURS PRN
Qty: 18 G | Refills: 3 | Status: SHIPPED | OUTPATIENT
Start: 2024-10-03

## 2024-10-03 RX ORDER — BUDESONIDE, GLYCOPYRROLATE, AND FORMOTEROL FUMARATE 160; 9; 4.8 UG/1; UG/1; UG/1
2 AEROSOL, METERED RESPIRATORY (INHALATION) EVERY 12 HOURS
Qty: 24 G | Refills: 2 | Status: SHIPPED | OUTPATIENT
Start: 2024-10-03 | End: 2025-01-01

## 2024-10-03 RX ORDER — LEVOFLOXACIN 750 MG/1
750 TABLET ORAL
Qty: 2 TABLET | Refills: 0 | Status: SHIPPED | OUTPATIENT
Start: 2024-10-03 | End: 2024-10-09 | Stop reason: ALTCHOICE

## 2024-10-03 RX ORDER — MONTELUKAST SODIUM 10 MG/1
10 TABLET ORAL NIGHTLY
Qty: 30 TABLET | Refills: 2 | Status: SHIPPED | OUTPATIENT
Start: 2024-10-03 | End: 2025-01-01

## 2024-10-03 RX ORDER — IPRATROPIUM BROMIDE AND ALBUTEROL SULFATE 2.5; .5 MG/3ML; MG/3ML
3 SOLUTION RESPIRATORY (INHALATION) 3 TIMES DAILY
Qty: 360 ML | Refills: 11 | Status: SHIPPED | OUTPATIENT
Start: 2024-10-03

## 2024-10-03 RX ORDER — AZITHROMYCIN 250 MG/1
250 TABLET, FILM COATED ORAL
Qty: 12 TABLET | Refills: 0 | Status: SHIPPED | OUTPATIENT
Start: 2024-10-07 | End: 2024-10-07

## 2024-10-03 RX ADMIN — IPRATROPIUM BROMIDE AND ALBUTEROL SULFATE 3 ML: .5; 3 SOLUTION RESPIRATORY (INHALATION) at 09:24

## 2024-10-03 RX ADMIN — NICOTINE 1 PATCH: 14 PATCH, EXTENDED RELEASE TRANSDERMAL at 09:02

## 2024-10-03 RX ADMIN — Medication 4 L/MIN: at 09:27

## 2024-10-03 RX ADMIN — Medication 2 L/MIN: at 09:07

## 2024-10-03 RX ADMIN — PANCRELIPASE 2 CAPSULE: 36000; 180000; 114000 CAPSULE, DELAYED RELEASE PELLETS ORAL at 12:21

## 2024-10-03 RX ADMIN — LEVOFLOXACIN 750 MG: 750 TABLET, FILM COATED ORAL at 08:58

## 2024-10-03 RX ADMIN — PREDNISONE 40 MG: 20 TABLET ORAL at 08:58

## 2024-10-03 RX ADMIN — Medication 3 L/MIN: at 06:21

## 2024-10-03 RX ADMIN — Medication 5 L/MIN: at 09:19

## 2024-10-03 RX ADMIN — Medication 4 L/MIN: at 09:13

## 2024-10-03 RX ADMIN — Medication 4 L/MIN: at 09:16

## 2024-10-03 RX ADMIN — Medication 3 L/MIN: at 09:09

## 2024-10-03 RX ADMIN — IPRATROPIUM BROMIDE AND ALBUTEROL SULFATE 3 ML: .5; 3 SOLUTION RESPIRATORY (INHALATION) at 06:20

## 2024-10-03 RX ADMIN — ENOXAPARIN SODIUM 40 MG: 40 INJECTION SUBCUTANEOUS at 08:58

## 2024-10-03 RX ADMIN — PANCRELIPASE 2 CAPSULE: 36000; 180000; 114000 CAPSULE, DELAYED RELEASE PELLETS ORAL at 08:59

## 2024-10-03 ASSESSMENT — COGNITIVE AND FUNCTIONAL STATUS - GENERAL
MOBILITY SCORE: 24
DAILY ACTIVITIY SCORE: 24

## 2024-10-03 ASSESSMENT — PAIN SCALES - GENERAL: PAINLEVEL_OUTOF10: 0 - NO PAIN

## 2024-10-03 ASSESSMENT — PAIN - FUNCTIONAL ASSESSMENT: PAIN_FUNCTIONAL_ASSESSMENT: 0-10

## 2024-10-03 NOTE — SIGNIFICANT EVENT
10/03/24 0905 10/03/24 0907 10/03/24 0909   Home Oxygen Eval / Desaturation Screen   Home Oxygen Evaluation/Desaturation Study Yes Yes Yes   Medical Gas Therapy None (Room air) Supplemental oxygen Supplemental oxygen   Medical Gas Delivery Method  --  Nasal cannula Nasal cannula   Oxygen L/min Patient is on During the Study  --  2 L/min 3 L/min   SpO2 (!) 83 % (!) 86 % (!) 88 %   Patient Activity During Study At rest At rest At rest   Patient Qualify for Home Oxygen  --   --   --    DME Company Selected by Patient  --   --   --    DME Notified of Home Oxygen Needs  --   --   --    Recommended L/min at Rest  --   --   --    Recommended L/min with Activity  --   --   --    Nocturnal Pulse Ox Study Completed  --   --   --    $ Pulse Oximetry Charge Multiple  (Walking pulse ox)  --   --       10/03/24 0913 10/03/24 0916 10/03/24 0919   Home Oxygen Eval / Desaturation Screen   Home Oxygen Evaluation/Desaturation Study Yes Yes Yes   Medical Gas Therapy Supplemental oxygen Supplemental oxygen Supplemental oxygen   Medical Gas Delivery Method Nasal cannula Nasal cannula Nasal cannula   Oxygen L/min Patient is on During the Study 4 L/min 4 L/min 5 L/min   SpO2 94 % (!) 88 % 94 %   Patient Activity During Study At rest Ambulating Ambulating   Patient Qualify for Home Oxygen  --   --   --    DME Company Selected by Patient  --   --   --    DME Notified of Home Oxygen Needs  --   --   --    Recommended L/min at Rest  --   --   --    Recommended L/min with Activity  --   --   --    Nocturnal Pulse Ox Study Completed  --   --   --    $ Pulse Oximetry Charge  --   --   --       10/03/24 0927   Home Oxygen Eval / Desaturation Screen   Home Oxygen Evaluation/Desaturation Study Yes   Medical Gas Therapy Supplemental oxygen   Medical Gas Delivery Method Nasal cannula   Oxygen L/min Patient is on During the Study 4 L/min   SpO2 92 %   Patient Activity During Study At rest   Patient Qualify for Home Oxygen Yes   DME Company Selected  by Patient [employer]   DME Notified of Home Oxygen Needs Yes   Recommended L/min at Rest 4 L/min   Recommended L/min with Activity 5 L/min   Nocturnal Pulse Ox Study Completed No   $ Pulse Oximetry Charge  --      Patient is mobile in the home and will need oxygen portability.

## 2024-10-03 NOTE — PROGRESS NOTES
Care Transitions: Met with patient at bedside. Role of TCC explained. Discharge plan remains home, needs a walker and shower chair. RT is working with patient to get him a nebulizer and new home O2 orders. He currently has O2 at home through [employer]. He is employed but currently on short term disability for his medical needs. Mami Moore RN/TCC    [phone] Spoke to [facility] concerning patient needing a walker and shower chair at home. They are not in network with patient insurance. Patient notified. States he uses [employer] for his O2 needs at home and is agreeable to use them for other needed DME if insurance covers items. [facility] States [facility] are in network with patient insurance; walker is covered but insurance does not cover a shower chair. Will fax order to [employer] for standard walker.  Mami Moore RN/TCC    [phone] Patient was reviewed in in care round meeting this AM and is medically ready for discharge today. Met with patient, sister Lashay and mom Renee at bedside. Informed will fax order for a walker to [employer] to be delivered to patient home. [facility] to call patient if there is a copay. Sister/Mom bought a walker from home for him to use until he receives one. Sister states she also took a shower chair and BSC to patient house today. Patient voiced he would like [facility] therapy and nursing since he is feeling weak and lives alone. He is agreeable to [facility]. Also discussed [facility] and flyer provided with services offered. Patient would like a referral for this program also. Hospital provider notified a referral be placed for [facility] and [facility] per patient request. Mami Moore RN/TCC    [phone] Faxed order for a standard walker to [employer] @ [phone]. Patient Mom/Sister will be transporting him home when discharged today. No further needs anticipatedfrom care transitions. Care team available upon request. Mami Moore RN/TCC

## 2024-10-03 NOTE — NURSING NOTE
Discharge Note: 10/3/2024 1407 Discharged via wheelchair to private car accompanied by PCT, personal belongings taken with pt, no distress noted, no complaints voiced. Oxygen on and flowing at 4 LPM KHUSHBOO Barnett RN

## 2024-10-03 NOTE — NURSING NOTE
Discharge Note: 10/3/2024 1343 Oxygen tank has been delivered to bedside. AVS and pt responsibilities reviewed with pt, pt family, and copy given. Pneumonia, oxygen, education reviewed with pt, pt family, and information sheets given. Pt verbalizes understanding of instructions received, verbalizes understanding of when to seek medical attention, denies any home going or personal care needs. Denies further questions or concerns. Reviewed follow up appts with pt and verbalizes understanding. Ericka VERDUGO

## 2024-10-03 NOTE — PROGRESS NOTES
Medication Education     Medication education for Dallin Crawford was provided to the patient and family (Mom and Sister) for the following medication(s):  Azithromycin  Levofloxacin  Montelukast  DuoNeb      Medication education provided by a Pharmacist:  ADR Counseling Dose, frequency, storage Proper dose, indication, possible ADRs How the medication works and benefits of taking it Benefits of taking the medication  Importance of compliance Potential duration of therapy    Identified potential barriers to education:  None    Method(s) of Education:  Verbal Written materials provided and reviewed    An opportunity to ask questions and receive answers was provided.     Assessment of understanding the patient and family:  2= meets goals/outcomes    Additional Notes (if applicable):   Medications were sent to [facility]    Sloane Royal, PharmD

## 2024-10-03 NOTE — SIGNIFICANT EVENT
Tried to wean pt from 3LNC to 2LC but pt could not tolerate the O2 wean for no longer than 20 minutes. SpO2 86-87% on 2LNC at rest. Increased pt back to 3L NC post SpO2 91%. Secure chat with  about prescribing pt an at Home Nebulizer upon discharge and  was agreeable with this recommendation.  said to have the day shift DrDandre Write a script for the at home nebulizer tomorrow 10/03/2024. Will pass this information along to day shift RT's at shift change. Pt choose to go with [employer] for the home nebulizer company due to him currently getting his Home O2 via [employer].

## 2024-10-03 NOTE — CARE PLAN
The patient's goals for the shift include  go home  Problem: Nutrition  Goal: Less than 5 days NPO/clear liquids  10/3/2024 1030 by Rosalva Anderson RN  Outcome: Progressing  10/3/2024 1029 by Rosalva Anderson RN  Outcome: Progressing  Goal: Oral intake greater than 50%  10/3/2024 1030 by Rosalva Anderson RN  Outcome: Progressing  10/3/2024 1029 by Rosalva Anderson RN  Outcome: Progressing  Goal: Oral intake greater 75%  10/3/2024 1030 by Rosalva Anderson, LUCINA  Outcome: Progressing  10/3/2024 1029 by Rosalva Anderson RN  Outcome: Progressing  Goal: Consume prescribed supplement  10/3/2024 1030 by Rosalva Anderson RN  Outcome: Progressing  10/3/2024 1029 by Rosalva Anderson RN  Outcome: Progressing  Goal: Adequate PO fluid intake  10/3/2024 1030 by Rosalva Anderson, LUCINA  Outcome: Progressing  10/3/2024 1029 by Rosalva Anderson RN  Outcome: Progressing  Goal: Nutrition support goals are met within 48 hrs  10/3/2024 1030 by Rosalva Anderson RN  Outcome: Progressing  10/3/2024 1029 by Rosalva Anderson RN  Outcome: Progressing  Goal: Nutrition support is meeting 75% of nutrient needs  10/3/2024 1030 by Rosalva Anderson RN  Outcome: Progressing  10/3/2024 1029 by Rosalva Anderson RN  Outcome: Progressing  Goal: Tube feed tolerance  10/3/2024 1030 by Rosalva Anderson RN  Outcome: Progressing  10/3/2024 1029 by Rosalva Anderson RN  Outcome: Progressing  Goal: BG  mg/dL  10/3/2024 1030 by Rosalva Anderson RN  Outcome: Progressing  10/3/2024 1029 by Rosalva Anderson RN  Outcome: Progressing  Goal: Lab values WNL  10/3/2024 1030 by Rosalva Anderson, RN  Outcome: Progressing  10/3/2024 1029 by Rosalva Anderson, LUCINA  Outcome: Progressing  Goal: Electrolytes WNL  10/3/2024 1030 by Rosalva Anderson, RN  Outcome: Progressing  10/3/2024 1029 by Rosalva Anderson RN  Outcome: Progressing  Goal: Promote healing  10/3/2024 1030 by Rosalva Anderson,  RN  Outcome: Progressing  10/3/2024 1029 by Rosalva Anderson RN  Outcome: Progressing  Goal: Maintain stable weight  10/3/2024 1030 by Rosalva Anderson RN  Outcome: Progressing  10/3/2024 1029 by Rosalva Anderson RN  Outcome: Progressing  Goal: Reduce weight from edema/fluid  10/3/2024 1030 by Rosalva Anderson RN  Outcome: Progressing  10/3/2024 1029 by Rosalva Anderson RN  Outcome: Progressing  Goal: Gradual weight gain  10/3/2024 1030 by Rosalva Anderson RN  Outcome: Progressing  10/3/2024 1029 by Rosalva Anderson RN  Outcome: Progressing  Goal: Improve ostomy output  10/3/2024 1030 by Rosalva Anderson RN  Outcome: Progressing  10/3/2024 1029 by Rosalva Anderson RN  Outcome: Progressing     Problem: Respiratory  Goal: Clear secretions with interventions this shift  10/3/2024 1030 by Rosalva Anderson RN  Outcome: Progressing  10/3/2024 1029 by Rosalva Anderson RN  Outcome: Progressing  Goal: No signs of respiratory distress (eg. Use of accessory muscles. Peds grunting)  10/3/2024 1030 by Rosalva Anderson RN  Outcome: Progressing  10/3/2024 1029 by Rosalva Anderson RN  Outcome: Progressing  Goal: Tolerate pulmonary toileting this shift  10/3/2024 1030 by Rosalva Anderson RN  Outcome: Progressing  10/3/2024 1029 by Rosalva Anderson RN  Outcome: Progressing  Goal: Verbalize decreased shortness of breath this shift  10/3/2024 1030 by Rosalva Anderson RN  Outcome: Progressing  10/3/2024 1029 by Rosalva Anderson RN  Outcome: Progressing  Goal: Wean oxygen to maintain O2 saturation per order/standard this shift  10/3/2024 1030 by Rosalva Anderson RN  Outcome: Progressing  10/3/2024 1029 by Rosalva Anderson RN  Outcome: Progressing       The clinical goals for the shift include less SOB

## 2024-10-03 NOTE — DISCHARGE SUMMARY
Discharge Diagnosis  Pneumonia of both lower lobes due to infectious organism    Issues Requiring Follow-Up  Pulmonology follow-up    Test Results Pending At Discharge  Pending Labs       Order Current Status    Blood Culture Preliminary result    Blood Culture Preliminary result    Blood Culture Preliminary result            Hospital Course  Dallin Crawford is a 64 y.o. male presenting with shortness of breath.  He presented to the emergency room secondary to shortness of breath.  He states that he called the emergency squad because his breathing was quite bad and he could not breathe pretty much at all.  He normally wears 2 L of oxygen via nasal cannula.    Patient was treated for CAP with Levaquin and was weaned back to his baseline of 2-3L o2. Patient was provided prescriptions for albuterol MDI, Duonebs, Singulair, Azithromycin 250 mg TID  per week regimen. He was instructed to continue Levaquin for the remaining 2 days (7 day total). Patient desaturates to 83% on room air requiring home oxygen. Patient will need 4L at rest and 5L with activity. Patient is also being sent home with nebulizer.  Patient instructed to follow-up with pulmonology.    More than 30 minutes were spent in coordinating patient discharge.    Pertinent Physical Exam At Time of Discharge  Physical Exam  Constitutional:       Appearance: He is underweight.   HENT:      Head: Atraumatic.      Comments: Temporal muscle wasting     Mouth/Throat:      Mouth: Mucous membranes are moist.   Eyes:      Extraocular Movements: Extraocular movements intact.      Conjunctiva/sclera: Conjunctivae normal.   Cardiovascular:      Rate and Rhythm: Normal rate and regular rhythm.      Pulses: Normal pulses.      Heart sounds: Normal heart sounds.   Pulmonary:      Effort: No accessory muscle usage or respiratory distress.      Breath sounds: Decreased air movement present. Decreased breath sounds present.   Abdominal:      General: Abdomen is flat. Bowel  sounds are normal.      Palpations: Abdomen is soft.   Skin:     General: Skin is warm.   Neurological:      Mental Status: He is oriented to person, place, and time. Mental status is at baseline.      Motor: Weakness present.   Psychiatric:         Mood and Affect: Mood normal.         Behavior: Behavior normal.         Thought Content: Thought content normal.         Home Medications     Medication List      START taking these medications     azithromycin 250 mg tablet; Commonly known as: Zithromax; Take 1 tablet   (250 mg) by mouth once a day on Monday, Wednesday, and Friday. Do not fill   before October 7, 2024.; Start taking on: October 7, 2024   ipratropium-albuteroL 0.5-2.5 mg/3 mL nebulizer solution; Commonly known   as: Duo-Neb; Take 3 mL by nebulization 3 times a day.   levoFLOXacin 750 mg tablet; Commonly known as: Levaquin; Take 1 tablet   (750 mg) by mouth once every 24 hours for 2 days.   montelukast 10 mg tablet; Commonly known as: Singulair; Take 1 tablet   (10 mg) by mouth once daily at bedtime.     CHANGE how you take these medications     albuterol 90 mcg/actuation inhaler; Inhale 2 puffs every 4 hours if   needed for wheezing or shortness of breath.; What changed: reasons to take   this     CONTINUE taking these medications     Breztri Aerosphere 160-9-4.8 mcg/actuation HFA aerosol inhaler; Generic   drug: budesonide-glycopyr-formoterol; Inhale 2 puffs every 12 hours.   Creon 36,000-114,000- 180,000 unit capsule,delayed release(DR/EC)   capsule; Generic drug: pancrelipase (Lip-Prot-Amyl); Take 2 capsules by   mouth 3 times daily (morning, midday, late afternoon).     ASK your doctor about these medications     varenicline 1 mg tablet; Commonly known as: Chantix; Take 1 tablet (1   mg) by mouth 2 times a day. Take with full glass of water.       Outpatient Follow-Up  Future Appointments   Date Time Provider Department Center   11/4/2024  9:20 AM Sona Delong MD [id number] None    11/25/2024  9:00 AM NEGRO Ashley-CNP [id number]       Parth Tay DO

## 2024-10-04 ENCOUNTER — PATIENT OUTREACH (OUTPATIENT)
Dept: HOME HEALTH SERVICES | Age: 64
End: 2024-10-04
Payer: COMMERCIAL

## 2024-10-04 ENCOUNTER — PATIENT OUTREACH (OUTPATIENT)
Age: 64
End: 2024-10-04
Payer: COMMERCIAL

## 2024-10-04 ENCOUNTER — TELEPHONE (OUTPATIENT)
Age: 64
End: 2024-10-04
Payer: COMMERCIAL

## 2024-10-04 LAB
BACTERIA BLD AEROBE CULT: ABNORMAL
BACTERIA BLD CULT: ABNORMAL
BACTERIA BLD CULT: NORMAL
GRAM STN SPEC: ABNORMAL

## 2024-10-04 SDOH — ECONOMIC STABILITY: FOOD INSECURITY: WITHIN THE PAST 12 MONTHS, THE FOOD YOU BOUGHT JUST DIDN'T LAST AND YOU DIDN'T HAVE MONEY TO GET MORE.: NEVER TRUE

## 2024-10-04 SDOH — SOCIAL STABILITY: SOCIAL INSECURITY: WITHIN THE LAST YEAR, HAVE YOU BEEN AFRAID OF YOUR PARTNER OR EX-PARTNER?: NO

## 2024-10-04 SDOH — HEALTH STABILITY: MENTAL HEALTH: HOW OFTEN DO YOU HAVE 6 OR MORE DRINKS ON ONE OCCASION?: NEVER

## 2024-10-04 SDOH — SOCIAL STABILITY: SOCIAL NETWORK: HOW OFTEN DO YOU ATTEND CHURCH OR RELIGIOUS SERVICES?: 1 TO 4 TIMES PER YEAR

## 2024-10-04 SDOH — ECONOMIC STABILITY: INCOME INSECURITY: HOW HARD IS IT FOR YOU TO PAY FOR THE VERY BASICS LIKE FOOD, HOUSING, MEDICAL CARE, AND HEATING?: NOT VERY HARD

## 2024-10-04 SDOH — HEALTH STABILITY: MENTAL HEALTH: HOW MANY STANDARD DRINKS CONTAINING ALCOHOL DO YOU HAVE ON A TYPICAL DAY?: PATIENT DOES NOT DRINK

## 2024-10-04 SDOH — SOCIAL STABILITY: SOCIAL INSECURITY: WITHIN THE LAST YEAR, HAVE YOU BEEN HUMILIATED OR EMOTIONALLY ABUSED IN OTHER WAYS BY YOUR PARTNER OR EX-PARTNER?: NO

## 2024-10-04 SDOH — SOCIAL STABILITY: SOCIAL NETWORK
DO YOU BELONG TO ANY CLUBS OR ORGANIZATIONS SUCH AS CHURCH GROUPS UNIONS, FRATERNAL OR ATHLETIC GROUPS, OR SCHOOL GROUPS?: NO

## 2024-10-04 SDOH — HEALTH STABILITY: MENTAL HEALTH
HOW OFTEN DO YOU NEED TO HAVE SOMEONE HELP YOU WHEN YOU READ INSTRUCTIONS, PAMPHLETS, OR OTHER WRITTEN MATERIAL FROM YOUR DOCTOR OR PHARMACY?: NEVER

## 2024-10-04 SDOH — ECONOMIC STABILITY: INCOME INSECURITY: IN THE PAST 12 MONTHS, HAS THE ELECTRIC, GAS, OIL, OR WATER COMPANY THREATENED TO SHUT OFF SERVICE IN YOUR HOME?: NO

## 2024-10-04 SDOH — SOCIAL STABILITY: SOCIAL NETWORK: HOW OFTEN DO YOU ATTENT MEETINGS OF THE CLUB OR ORGANIZATION YOU BELONG TO?: NEVER

## 2024-10-04 SDOH — HEALTH STABILITY: PHYSICAL HEALTH: ON AVERAGE, HOW MANY DAYS PER WEEK DO YOU ENGAGE IN MODERATE TO STRENUOUS EXERCISE (LIKE A BRISK WALK)?: 0 DAYS

## 2024-10-04 SDOH — HEALTH STABILITY: MENTAL HEALTH: HOW OFTEN DO YOU HAVE A DRINK CONTAINING ALCOHOL?: NEVER

## 2024-10-04 SDOH — SOCIAL STABILITY: SOCIAL NETWORK: ARE YOU MARRIED, WIDOWED, DIVORCED, SEPARATED, NEVER MARRIED, OR LIVING WITH A PARTNER?: DIVORCED

## 2024-10-04 SDOH — ECONOMIC STABILITY: HOUSING INSECURITY: AT ANY TIME IN THE PAST 12 MONTHS, WERE YOU HOMELESS OR LIVING IN A SHELTER (INCLUDING NOW)?: NO

## 2024-10-04 SDOH — HEALTH STABILITY: MENTAL HEALTH
STRESS IS WHEN SOMEONE FEELS TENSE, NERVOUS, ANXIOUS, OR CAN'T SLEEP AT NIGHT BECAUSE THEIR MIND IS TROUBLED. HOW STRESSED ARE YOU?: NOT AT ALL

## 2024-10-04 SDOH — ECONOMIC STABILITY: FOOD INSECURITY: WITHIN THE PAST 12 MONTHS, YOU WORRIED THAT YOUR FOOD WOULD RUN OUT BEFORE YOU GOT MONEY TO BUY MORE.: NEVER TRUE

## 2024-10-04 SDOH — ECONOMIC STABILITY: INCOME INSECURITY: IN THE LAST 12 MONTHS, WAS THERE A TIME WHEN YOU WERE NOT ABLE TO PAY THE MORTGAGE OR RENT ON TIME?: NO

## 2024-10-04 SDOH — SOCIAL STABILITY: SOCIAL NETWORK: HOW OFTEN DO YOU GET TOGETHER WITH FRIENDS OR RELATIVES?: MORE THAN THREE TIMES A WEEK

## 2024-10-04 SDOH — SOCIAL STABILITY: SOCIAL NETWORK
IN A TYPICAL WEEK, HOW MANY TIMES DO YOU TALK ON THE PHONE WITH FAMILY, FRIENDS, OR NEIGHBORS?: MORE THAN THREE TIMES A WEEK

## 2024-10-04 SDOH — ECONOMIC STABILITY: HOUSING INSECURITY: IN THE PAST 12 MONTHS, HOW MANY TIMES HAVE YOU MOVED WHERE YOU WERE LIVING?: 0

## 2024-10-04 SDOH — HEALTH STABILITY: PHYSICAL HEALTH: ON AVERAGE, HOW MANY MINUTES DO YOU ENGAGE IN EXERCISE AT THIS LEVEL?: 0 MIN

## 2024-10-04 ASSESSMENT — LIFESTYLE VARIABLES
SKIP TO QUESTIONS 9-10: 1
AUDIT-C TOTAL SCORE: 0

## 2024-10-04 NOTE — PROGRESS NOTES
Discharge Facility:[facility]  Discharge Diagnosis:Pneumonia of both lower lobes  Admission Date:9/29/24  Discharge Date:10/3/24    PCP Appointment Date:10/9/24  Specialist Appointment Date: D  Hospital Encounter and Summary Linked: Yes  See discharge assessment below for further details    Medications  Medications reviewed with patient/caregiver?: Yes (10/4/2024 11:16 AM)  Is the patient having any side effects they believe may be caused by any medication additions or changes?: No (10/4/2024 11:16 AM)  Does the patient have all medications ordered at discharge?: Yes (10/4/2024 11:16 AM)  Care Management Interventions: Provided patient education (10/4/2024 11:16 AM)  Prescription Comments: Zithromax, Duo-nec. Levofloxacin, Singular (10/4/2024 11:16 AM)  Is the patient taking all medications as directed (includes completed medication regime)?: Yes (10/4/2024 11:16 AM)  Care Management Interventions: Provided patient education (10/4/2024 11:16 AM)  Medication Comments: Change Albuterol (10/4/2024 11:16 AM)    Appointments  Does the patient have a primary care provider?: Yes (10/4/2024 11:16 AM)  Care Management Interventions: Verified appointment date/time/provider (10/4/2024 11:16 AM)  Has the patient kept scheduled appointments due by today?: Yes (10/4/2024 11:16 AM)  Care Management Interventions: Advised patient to keep appointment; Educated on importance of keeping appointment (10/4/2024 11:16 AM)    Self Management  Has home health visited the patient within 72 hours of discharge?: Not applicable (10/4/2024 11:16 AM)  What Durable Medical Equipment (DME) was ordered?: N/A (10/4/2024 11:16 AM)    Patient Teaching  Does the patient have access to their discharge instructions?: Yes (10/4/2024 11:16 AM)  Care Management Interventions: Reviewed instructions with patient (10/4/2024 11:16 AM)  What is the patient's perception of their health status since discharge?: Improving (10/4/2024 11:16 AM)  Is the  patient/caregiver able to teach back the hierarchy of who to call/visit for symptoms/problems? PCP, Specialist, Home Health nurse, Urgent Care, ED, 911: Yes (10/4/2024 11:16 AM)

## 2024-10-04 NOTE — TELEPHONE ENCOUNTER
Patient called and left a voicemail stating that he was in the hospital for Double pneumonia. He states he now has to be on 4 liters of O2 continually  and that when he is active it has to be 5 liters. He called [facility] and they told him he needs to call his PCP and have a order sent over with office notes stating why he needs this. Can you put a order in for him? Thank you

## 2024-10-04 NOTE — TELEPHONE ENCOUNTER
If they need office notes he will need an appointment - can we send his hospital discharge information?

## 2024-10-05 NOTE — PROGRESS NOTES
Daily Call Note:   LVM and HHVC appt date 10/6 and time,1100.      Last 3 Weights:  Wt Readings from Last 7 Encounters:   10/01/24 49.9 kg (110 lb)   09/16/24 48.1 kg (106 lb)   08/05/24 48.4 kg (106 lb 11.2 oz)   05/20/24 47.6 kg (105 lb)   05/20/24 47.4 kg (104 lb 6.4 oz)   05/10/24 47.8 kg (105 lb 4.8 oz)   04/30/24 47.3 kg (104 lb 3.2 oz)         Virtual Visits--Scheduled (Most Recent Date at Top)  Follow up Appointments  Recent Visits  No visits were found meeting these conditions.  Showing recent visits within past 30 days and meeting all other requirements  Future Appointments  Date Type Provider Dept   10/09/24 Appointment Sona Delong MD Do [id number] [facility]   11/04/24 Appointment Sona Delong MD Do [id number] [facility]   Showing future appointments within next 90 days and meeting all other requirements

## 2024-10-06 ENCOUNTER — TELEMEDICINE CLINICAL SUPPORT (OUTPATIENT)
Dept: CARE COORDINATION | Age: 64
End: 2024-10-06
Payer: COMMERCIAL

## 2024-10-06 ENCOUNTER — PATIENT OUTREACH (OUTPATIENT)
Dept: HOME HEALTH SERVICES | Age: 64
End: 2024-10-06

## 2024-10-06 DIAGNOSIS — F17.200 TOBACCO USE DISORDER: Primary | ICD-10-CM

## 2024-10-06 DIAGNOSIS — J18.9 PNEUMONIA OF BOTH LUNGS DUE TO INFECTIOUS ORGANISM, UNSPECIFIED PART OF LUNG: ICD-10-CM

## 2024-10-06 LAB — BACTERIA BLD CULT: NORMAL

## 2024-10-06 RX ORDER — IBUPROFEN 200 MG
1 TABLET ORAL EVERY 24 HOURS
Qty: 30 PATCH | Refills: 0 | Status: SHIPPED | OUTPATIENT
Start: 2024-10-06 | End: 2024-11-05

## 2024-10-06 RX ORDER — GUAIFENESIN 600 MG/1
1200 TABLET, EXTENDED RELEASE ORAL 2 TIMES DAILY
Qty: 40 TABLET | Refills: 0 | Status: SHIPPED | OUTPATIENT
Start: 2024-10-06 | End: 2024-10-16

## 2024-10-06 NOTE — PROGRESS NOTES
Daily Call Note:   [facility] weekly call complete w arnol RN,  and ESTHER Marquez  NP.       Denies CP/ edema   HR 68  POX 97 % O2 at 4l NC  Medications reviewed, no refills needed.  Follow up appts scheduled, pt sees Pulmonary at [facility] on 10/23, and sees PCP next week.  NP prescribed Nicotine patches, and Mucinex.  Pt unable to connect to [facility] due to service area, pt understanding.    No difficulty in affording medications.  All questions/concerns addressed.   Next weekly [facility] scheduled   Pt Education: POC  Barriers:   Topics for Daily Review: POX  Pt demonstrates clear understanding: Yes    Daily Weight:  There were no vitals filed for this visit.   Last 3 Weights:  Wt Readings from Last 7 Encounters:   10/01/24 49.9 kg (110 lb)   09/16/24 48.1 kg (106 lb)   08/05/24 48.4 kg (106 lb 11.2 oz)   05/20/24 47.6 kg (105 lb)   05/20/24 47.4 kg (104 lb 6.4 oz)   05/10/24 47.8 kg (105 lb 4.8 oz)   04/30/24 47.3 kg (104 lb 3.2 oz)       Masimo Device: No   Masimo Clinical Impression:     Virtual Visits--Scheduled (Most Recent Date at Top)  Follow up Appointments  Recent Visits  No visits were found meeting these conditions.  Showing recent visits within past 30 days and meeting all other requirements  Future Appointments  Date Type Provider Dept   10/09/24 Appointment Sona Delong MD Do [id number] [facility]   11/04/24 Appointment Sona Delong MD Do [id number] [facility]   Showing future appointments within next 90 days and meeting all other requirements       Frequency of RN Calls & Virtual Visits per Team Agreement: Healthy at Home Frequency: Daily    Medication issues Addressed (what was done): Nicotine patches and Mucinex prescribed    Follow up appointments scheduled by [facility] Staff:   Referrals made by [facility] staff:

## 2024-10-07 LAB — BACTERIA BLD CULT: NORMAL

## 2024-10-07 RX ORDER — AZITHROMYCIN 250 MG/1
250 TABLET, FILM COATED ORAL
Qty: 12 TABLET | Refills: 3 | Status: SHIPPED | OUTPATIENT
Start: 2024-10-07 | End: 2025-01-27

## 2024-10-08 ENCOUNTER — TELEPHONE (OUTPATIENT)
Age: 64
End: 2024-10-08
Payer: COMMERCIAL

## 2024-10-08 ENCOUNTER — HOME CARE VISIT (OUTPATIENT)
Dept: HOME HEALTH SERVICES | Facility: HOME HEALTH | Age: 64
End: 2024-10-08

## 2024-10-08 ENCOUNTER — PATIENT OUTREACH (OUTPATIENT)
Dept: HOME HEALTH SERVICES | Age: 64
End: 2024-10-08
Payer: COMMERCIAL

## 2024-10-08 VITALS
OXYGEN SATURATION: 98 % | SYSTOLIC BLOOD PRESSURE: 133 MMHG | DIASTOLIC BLOOD PRESSURE: 76 MMHG | HEART RATE: 76 BPM | RESPIRATION RATE: 18 BRPM

## 2024-10-08 VITALS — DIASTOLIC BLOOD PRESSURE: 72 MMHG | SYSTOLIC BLOOD PRESSURE: 132 MMHG | OXYGEN SATURATION: 97 %

## 2024-10-08 SDOH — ECONOMIC STABILITY: HOUSING INSECURITY: EVIDENCE OF SMOKING MATERIAL: 0

## 2024-10-08 SDOH — HEALTH STABILITY: PHYSICAL HEALTH: EXERCISE TYPE: SEATED

## 2024-10-08 SDOH — HEALTH STABILITY: MENTAL HEALTH: SMOKING IN HOME: 0

## 2024-10-08 ASSESSMENT — ENCOUNTER SYMPTOMS
OCCASIONAL FEELINGS OF UNSTEADINESS: 0
LOSS OF SENSATION IN FEET: 0
PAIN SEVERITY GOAL: 0/10
DENIES PAIN: 1
PERSON REPORTING PAIN: PATIENT
DEPRESSION: 0
HIGHEST PAIN SEVERITY IN PAST 24 HOURS: 0/10
LOWEST PAIN SEVERITY IN PAST 24 HOURS: 0/10

## 2024-10-08 ASSESSMENT — PAIN SCALES - PAIN ASSESSMENT IN ADVANCED DEMENTIA (PAINAD)
FACIALEXPRESSION: 0
CONSOLABILITY: 0 - NO NEED TO CONSOLE.
TOTALSCORE: 0
FACIALEXPRESSION: 0 - SMILING OR INEXPRESSIVE.
NEGVOCALIZATION: 0
BREATHING: 0
CONSOLABILITY: 0
NEGVOCALIZATION: 0 - NONE.
BODYLANGUAGE: 0
BODYLANGUAGE: 0 - RELAXED.

## 2024-10-08 ASSESSMENT — ACTIVITIES OF DAILY LIVING (ADL): ADLS_COMMENTS: IND

## 2024-10-08 NOTE — TELEPHONE ENCOUNTER
Patient needs to have a 6 minute walk testing done at the hospital and also a overnight pulse ox test done as well. [employer] will not process his order without it

## 2024-10-08 NOTE — Clinical Note
patient is a non admit. upon entering house patient was ambulating very well with no dme. is not new to oxygen use, just new to continuous. has no questions regarding meds. no memory deficits. vitals were good. pt agreeable to continue to follow up with MD as needed. still has productive cough

## 2024-10-08 NOTE — PROGRESS NOTES
Daily call completed. Pt states he is doing well. Denies any new symptoms or concerns. Denies need for med refills at this time. Pt had PT out earlier, states it went well. Denies any further questions/concerns/needs at this time. Pt goes to see PCP tomorrow. Aware of upcoming appts. [facility] 10/13 @ 1100.     O2 97  132/72    Patient's Address:   [address] [place] [address] [place] [address]  **  If this is not the address patient will receive services - alert team and address in EMR**       Patient Contacts:  Extended Emergency Contact Information  Primary Emergency Contact: Nica Crawfordley  Home Phone: [phone]  Relation: Parent  Secondary Emergency Contact: JUN DOWNEY  Mobile Phone: [phone]  Relation: Sister  Preferred language: English   needed? No                                Patient's Preferred Phone: [phone]  Patient's E-mail: [email or web address]

## 2024-10-09 ENCOUNTER — APPOINTMENT (OUTPATIENT)
Age: 64
End: 2024-10-09
Payer: COMMERCIAL

## 2024-10-09 VITALS
HEIGHT: 69 IN | HEART RATE: 79 BPM | OXYGEN SATURATION: 97 % | SYSTOLIC BLOOD PRESSURE: 122 MMHG | BODY MASS INDEX: 16.14 KG/M2 | WEIGHT: 109 LBS | DIASTOLIC BLOOD PRESSURE: 68 MMHG

## 2024-10-09 DIAGNOSIS — J43.2 CENTRILOBULAR EMPHYSEMA (MULTI): Primary | ICD-10-CM

## 2024-10-09 DIAGNOSIS — Z23 IMMUNIZATION DUE: ICD-10-CM

## 2024-10-09 PROCEDURE — 99496 TRANSJ CARE MGMT HIGH F2F 7D: CPT | Performed by: FAMILY MEDICINE

## 2024-10-09 PROCEDURE — 90472 IMMUNIZATION ADMIN EACH ADD: CPT | Performed by: FAMILY MEDICINE

## 2024-10-09 PROCEDURE — 90673 RIV3 VACCINE NO PRESERV IM: CPT | Performed by: FAMILY MEDICINE

## 2024-10-09 PROCEDURE — 90471 IMMUNIZATION ADMIN: CPT | Performed by: FAMILY MEDICINE

## 2024-10-09 PROCEDURE — 3008F BODY MASS INDEX DOCD: CPT | Performed by: FAMILY MEDICINE

## 2024-10-09 PROCEDURE — 90677 PCV20 VACCINE IM: CPT | Performed by: FAMILY MEDICINE

## 2024-10-09 NOTE — PATIENT INSTRUCTIONS
He had oxygen/pulmonary testing at the hospital - he states that he did the 6 min walking test before he left the hospital as well as monitoring overnight.  [facility] states that they needed us to order those tests again but we will get the results from the hospital.  Continue other medications, will get flu and pneumonia shots today.  Follow up next month as scheduled.

## 2024-10-09 NOTE — PROGRESS NOTES
"Patient: Dallin Crawford  : 1960  PCP: Sona Delong MD  MRN: [id number]  Program: Healthy at Home  Status: Enrolled  Effective Dates: 10/4/2024 - present  Responsible Staff: [facility]  Social Determinants to be Addressed: No information to display    Transitional Care Management  Status: Enrolled  Effective Dates: 10/4/2024 - present  Responsible Staff: Candice Scott LPN  Social Determinants to be Addressed: Physical Activity, Social Connections, Stress, Tobacco Use         Dallin Crawford is a 64 y.o. male presenting today for follow-up after being discharged from the hospital 6 days ago. The main problem requiring admission was pneumonia. The discharge summary and/or Transitional Care Management documentation was reviewed. Medication reconciliation was performed as indicated via the \"Jeffrey as Reviewed\" timestamp.     Dallin Crafword was contacted by Transitional Care Management services two days after his discharge. This encounter and supporting documentation was reviewed.    Here for follow up recent hospitalization for bilateral pneumonia.  His oxygen was increased while in the hospital but we are having difficulty getting it through his [employer] company.  He is switching to a new pulmonologist and has an appointment there on 10/21.  He states that he is feeling a lot better.      /68 (BP Location: Left arm, Patient Position: Sitting, BP Cuff Size: Adult)   Pulse 79   Ht 1.753 m (5' 9\")   Wt 49.4 kg (109 lb)   SpO2 97% Comment: 4L 02 sitting  BMI 16.10 kg/m²     Physical Exam  Vitals reviewed.   Constitutional:       General: He is not in acute distress.  Cardiovascular:      Rate and Rhythm: Normal rate and regular rhythm.      Heart sounds: No murmur heard.  Pulmonary:      Effort: Pulmonary effort is normal. No respiratory distress.      Breath sounds: Normal breath sounds.   Skin:     General: Skin is warm and dry.   Neurological:      General: No focal deficit present. "      Mental Status: He is alert. Mental status is at baseline.         The complexity of medical decision making for this patient's transitional care is high.    Assessment/Plan   Problem List Items Addressed This Visit             ICD-10-CM    Centrilobular emphysema (Multi) - Primary J43.2    Relevant Orders    Disability Placard     Other Visit Diagnoses         Codes    Immunization due     Z23    Relevant Orders    Pneumococcal conjugate vaccine, 20-valent (PREVNAR 20)    Flu vaccine, trivalent, preservative free, no egg protein, age 18y+ (Flublok)

## 2024-10-10 ENCOUNTER — PATIENT OUTREACH (OUTPATIENT)
Dept: HOME HEALTH SERVICES | Age: 64
End: 2024-10-10
Payer: COMMERCIAL

## 2024-10-10 NOTE — PROGRESS NOTES
"Spoke with pt for daily call. Pt states he is feeling better and better each day. Had PCP appt yesterday and it went well- BP was 126/65 at Dr rossi. Pt states his Creon RX was expensive but his insurance has decided to cover it now since he has been on it for some time now. [facility] SN and PT have signed off stating he \"does not need them in home.\" No other questions or concerns.            Patient's Address:   [address] [place] [address] [place] [address]  **  If this is not the address patient will receive services - alert team and address in EMR**       Patient Contacts:  Extended Emergency Contact Information  Primary Emergency Contact: Renee Crawford  Home Phone: [phone]  Relation: Parent  Secondary Emergency Contact: JUN DOWNEY  Mobile Phone: [phone]  Relation: Sister  Preferred language: English   needed? No                                Patient's Preferred Phone: [phone]  Patient's E-mail: [email or web address]                                      "

## 2024-10-12 ENCOUNTER — PATIENT OUTREACH (OUTPATIENT)
Dept: HOME HEALTH SERVICES | Age: 64
End: 2024-10-12
Payer: COMMERCIAL

## 2024-10-12 VITALS — HEART RATE: 68 BPM | OXYGEN SATURATION: 96 %

## 2024-10-12 NOTE — PROGRESS NOTES
Pt states he is doing well. He is currently on 4L NC and pulse ox ranging from % and HR 68-90. Pt is not checking his BP. Pt may attempt to titrate down oxygen today and monitor pulse ox and see how he feels. Advised if he is comfortable with that, go ahead and we can discuss during [facility] tomorrow           Patient's Address[place]   [address] [place] [address]  **  If this is not the address patient will receive services - alert team and address in EMR**       Patient Contacts:  Extended Emergency Contact Information  Primary Emergency Contact: YvetteRenee  Home Phone: [phone]  Relation: Parent  Secondary Emergency Contact: JUN DOWNEY  Mobile Phone: [phone]  Relation: Sister  Preferred language: English   needed? No                                Patient's Preferred Phone: [phone]  Patient's E-mail: [email or web address]

## 2024-10-13 ENCOUNTER — PATIENT OUTREACH (OUTPATIENT)
Dept: HOME HEALTH SERVICES | Age: 64
End: 2024-10-13

## 2024-10-13 ENCOUNTER — APPOINTMENT (OUTPATIENT)
Dept: CARE COORDINATION | Age: 64
End: 2024-10-13
Payer: COMMERCIAL

## 2024-10-13 DIAGNOSIS — J43.2 CENTRILOBULAR EMPHYSEMA (MULTI): Primary | ICD-10-CM

## 2024-10-13 DIAGNOSIS — J18.9 PNEUMONIA OF BOTH LUNGS DUE TO INFECTIOUS ORGANISM, UNSPECIFIED PART OF LUNG: ICD-10-CM

## 2024-10-13 DIAGNOSIS — F17.200 TOBACCO USE DISORDER: ICD-10-CM

## 2024-10-13 NOTE — PROGRESS NOTES
Weekly [facility] audio call completed with SHAKEEL TOM and this RN. Patient doing well. Taking walks outside with oxygen. Denies smoking. Using nicotine patches. Suggested saline nose spray for dry/occasional bloody nose.     Patient wondering if it is time to wean oxygen. He is out of the area for [place]. Has pulse ox he uses at home. Advised to monitor pulse ox frequently at rest and activity for 2 days until next daily call tuesday. Pulse ox goal 90% or above. Confirmed current use of 4L at rest and 5L with activity. 2L baseline. DME: [facility], states no issues with supply. Has portable concentrator (can titrate by 0.5 L) and tanks(can only titrate by 1L).SHAKEEL Thibodeaux is okay with weaning oxygen slowly with help of nurses at [facility].     Medication reconciliation done for inhalers. Patient denies need for assistance with medication cost/food/bills/oxygen supply. Next [facility] 10/20 @ 1100.

## 2024-10-15 ENCOUNTER — PATIENT OUTREACH (OUTPATIENT)
Dept: HOME HEALTH SERVICES | Age: 64
End: 2024-10-15
Payer: COMMERCIAL

## 2024-10-15 VITALS — HEART RATE: 88 BPM | OXYGEN SATURATION: 96 %

## 2024-10-15 NOTE — PROGRESS NOTES
Daily call completed with patient. He is feeling well, interested in weaning down oxygen. Prior to hospitalization, he only had to wear 2LNC at night and he is still having bloody noses from dry air. Currently 4LNC at rest, 5LNC with activity. He has purchased saline nose spray and it helps somewhat. Has not obtained humidifer yet. Encouraged patient to do this since winter is coming. Spo2 around the house 92%-99%. HR up to 105. Currently 88. Per Dr. Henderson, patient can wean down oxygen if spo2 90% or above. Discussed with patient weaning down 0.5L at a time if 90% with activity. Patient can wean down to 3L at rest if he wants before next call Thursday. Keeping 5LNC with activity to be safe since patient hasn't checked spo2 while walking outside.    Patient also concerned about LLE swelling that has been going on for a couple weeks when he is active/walking. Encouraged patient to track weight daily and to elevate leg after activity to decrease swelling. Dr. Henderson notified, no new orders. All questions answered to patient satisfaction.

## 2024-10-17 ENCOUNTER — PATIENT OUTREACH (OUTPATIENT)
Dept: HOME HEALTH SERVICES | Age: 64
End: 2024-10-17
Payer: COMMERCIAL

## 2024-10-17 NOTE — PROGRESS NOTES
Daily Call Note:  [facility] daily call complete.  Denies CP/edema  POX 98 % at 3l, pt is attempting to wean oxygen checking POX routinely   All questions/ concerns addressed  Verified upcoming weekly [facility] call    Pt Education:  POC   Barriers:   Topics for Daily Review:   Pt demonstrates clear understanding: Yes    Daily Weight:  There were no vitals filed for this visit.   Last 3 Weights:  Wt Readings from Last 7 Encounters:   10/09/24 49.4 kg (109 lb)   10/01/24 49.9 kg (110 lb)   09/16/24 48.1 kg (106 lb)   08/05/24 48.4 kg (106 lb 11.2 oz)   05/20/24 47.6 kg (105 lb)   05/20/24 47.4 kg (104 lb 6.4 oz)   05/10/24 47.8 kg (105 lb 4.8 oz)       Masimo Device: No   Masimo Clinical Impression:     Virtual Visits--Scheduled (Most Recent Date at Top)  Follow up Appointments  Recent Visits  Date Type Provider Dept   10/09/24 Office Visit Sona Delong MD Do [id number] [facility]   Showing recent visits within past 30 days and meeting all other requirements  Future Appointments  Date Type Provider Dept   11/04/24 Appointment Sona Delong MD Do [id number] [facility]   Showing future appointments within next 90 days and meeting all other requirements       Frequency of RN Calls & Virtual Visits per Team Agreement: Healthy at Home Frequency: Bi-Weekly    Medication issues Addressed (what was done):     Follow up appointments scheduled by [facility] Staff:   Referrals made by [facility] staff:

## 2024-10-19 ENCOUNTER — PATIENT OUTREACH (OUTPATIENT)
Dept: HOME HEALTH SERVICES | Age: 64
End: 2024-10-19
Payer: COMMERCIAL

## 2024-10-19 NOTE — PROGRESS NOTES
Daily Call Note: Call complete. Patient states he is doing ok. No new concerns. He states he tried to decrease the O2 to 3L and felt harder to breath, SpO2 92-93%, so he increased it back to 3.5 L and SpO2 95-97%. I did explain that 92-93% is ok but only if his breathing is not difficult or labored, he verbalized understanding, stating that no one ever told him where it should be. Next [facility] 10/20/24 @ 1100, verbalized understanding. No other questions at this time.     Pt Education: per POC  Barriers: none  Topics for Daily Review:   Pt demonstrates clear understanding: Yes    Daily Weight:  There were no vitals filed for this visit.   Last 3 Weights:  Wt Readings from Last 7 Encounters:   10/09/24 49.4 kg (109 lb)   10/01/24 49.9 kg (110 lb)   09/16/24 48.1 kg (106 lb)   08/05/24 48.4 kg (106 lb 11.2 oz)   05/20/24 47.6 kg (105 lb)   05/20/24 47.4 kg (104 lb 6.4 oz)   05/10/24 47.8 kg (105 lb 4.8 oz)       Masimo Device: No   Masimo Clinical Impression: n/a    Virtual Visits--Scheduled (Most Recent Date at Top)  Follow up Appointments  Recent Visits  Date Type Provider Dept   10/09/24 Office Visit Sona Delong MD Do [id number] [facility]   Showing recent visits within past 30 days and meeting all other requirements  Future Appointments  Date Type Provider Dept   11/04/24 Appointment Sona Delong MD Do [id number] [facility]   Showing future appointments within next 90 days and meeting all other requirements       Frequency of RN Calls & Virtual Visits per Team Agreement: Healthy at Home Frequency: TU/TH/SAT    Medication issues Addressed (what was done): none    Follow up appointments scheduled by [facility] Staff: none  Referrals made by [facility] staff: none

## 2024-10-20 ENCOUNTER — PATIENT OUTREACH (OUTPATIENT)
Dept: HOME HEALTH SERVICES | Age: 64
End: 2024-10-20

## 2024-10-20 ENCOUNTER — APPOINTMENT (OUTPATIENT)
Dept: CARE COORDINATION | Age: 64
End: 2024-10-20
Payer: COMMERCIAL

## 2024-10-20 DIAGNOSIS — J18.9 PNEUMONIA OF BOTH LUNGS DUE TO INFECTIOUS ORGANISM, UNSPECIFIED PART OF LUNG: Primary | ICD-10-CM

## 2024-10-20 DIAGNOSIS — J43.2 CENTRILOBULAR EMPHYSEMA (MULTI): ICD-10-CM

## 2024-10-20 DIAGNOSIS — F17.200 TOBACCO USE DISORDER: ICD-10-CM

## 2024-10-20 NOTE — PROGRESS NOTES
"Daily Call Note: HHVC COMPLETED WITH DEJA MEIER    Patient states he is feeling good.  No change in symptoms since last weekly HHVC.     Patient denies fever, chills, nausea, vomiting, or diarrhea.      Patient stated he attempted to wean oxygent \"but it was not good.\".    Currently on 4 liters of oxygen via NC.      Patient denies any more epistaxis with blowing his nose.    Mr. Contreras stated he has an appointment with another Pulmonologist for a 2nd opinion tomorrow.   09:00 AM - Office Visit  [facility] Pulmonary Disease [facility] - João Rodriguez MD       Mr. Contreras is using Albuterol \"about 3 times a day\" as needed.     Patient has no questions, concerns, or needs at this time.      Advised patient to contact the Healthy at Home Team for question or concerns 24/7.     [facility] 10/26 @ 09:30      Pt Education:   Barriers:   Topics for Daily Review:   Pt demonstrates clear understanding:     Daily Weight:  There were no vitals filed for this visit.   Last 3 Weights:  Wt Readings from Last 7 Encounters:   10/09/24 49.4 kg (109 lb)   10/01/24 49.9 kg (110 lb)   09/16/24 48.1 kg (106 lb)   08/05/24 48.4 kg (106 lb 11.2 oz)   05/20/24 47.6 kg (105 lb)   05/20/24 47.4 kg (104 lb 6.4 oz)   05/10/24 47.8 kg (105 lb 4.8 oz)       Masimo Device:    Masimo Clinical Impression:     Virtual Visits--Scheduled (Most Recent Date at Top)  Follow up Appointments  Recent Visits  Date Type Provider Dept   10/09/24 Office Visit Sona Delong MD Do [id number] [facility]   Showing recent visits within past 30 days and meeting all other requirements  Future Appointments  Date Type Provider Dept   11/04/24 Appointment Sona Delong MD Do [id number] [facility]   Showing future appointments within next 90 days and meeting all other requirements       Frequency of RN Calls & Virtual Visits per Team Agreement:     Medication issues Addressed (what was done):     Follow up appointments scheduled by [facility] Staff: "   Referrals made by [facility] staff:

## 2024-10-24 ENCOUNTER — PATIENT OUTREACH (OUTPATIENT)
Dept: HOME HEALTH SERVICES | Age: 64
End: 2024-10-24
Payer: COMMERCIAL

## 2024-10-24 NOTE — PROGRESS NOTES
Daily call completed patient saw pulmonology added some test and referral for oncology due to labs no questions or concerns no medication changes from pulmonologist next [facility] Saturday encouraged to call before than with any needs

## 2024-10-26 ENCOUNTER — PATIENT OUTREACH (OUTPATIENT)
Dept: HOME HEALTH SERVICES | Age: 64
End: 2024-10-26

## 2024-10-26 ENCOUNTER — APPOINTMENT (OUTPATIENT)
Dept: CARE COORDINATION | Age: 64
End: 2024-10-26
Payer: COMMERCIAL

## 2024-10-26 DIAGNOSIS — J43.2 CENTRILOBULAR EMPHYSEMA (MULTI): ICD-10-CM

## 2024-10-26 DIAGNOSIS — J18.9 PNEUMONIA OF BOTH LUNGS DUE TO INFECTIOUS ORGANISM, UNSPECIFIED PART OF LUNG: ICD-10-CM

## 2024-10-26 DIAGNOSIS — F17.200 TOBACCO USE DISORDER: Primary | ICD-10-CM

## 2024-10-26 NOTE — PROGRESS NOTES
Daily Call Note: [facility] weekly call with the patient and CNP Penelope Marquez.  The patient stated he is currently using oxygen 3 liters per NC but increases to 4 liters with activity, such as walking - he uses a pulse oximeter to measure SpO2 - 97% this morning. He is using the pulmonary medications as directed and feels he has improved greatly  - he reports that he still has occasional coughing productive of small amounts of green yellow sputum- he denies abdominal pain, nausea and vomiting. The patient had a follow up appointment with a new pulmonologist, Dr. João Armstrong, in [place], [place]. He is very comfortable with this new provider and feels that he is being well managed at this point. He is still interested in weaning himself back to the baseline of 2L/NC but is not at that place yet. He has follow up with Heme/Onc for blood work that revealed low platelets, he said. He is also scheduled for Cardiac US, lung CR, PFT and home sleep study.    Daily Weight:  There were no vitals filed for this visit.   Last 3 Weights:  Wt Readings from Last 7 Encounters:   10/09/24 49.4 kg (109 lb)   10/01/24 49.9 kg (110 lb)   09/16/24 48.1 kg (106 lb)   08/05/24 48.4 kg (106 lb 11.2 oz)   05/20/24 47.6 kg (105 lb)   05/20/24 47.4 kg (104 lb 6.4 oz)   05/10/24 47.8 kg (105 lb 4.8 oz)       Virtual Visits--Scheduled (Most Recent Date at Top)  Follow up Appointments  Recent Visits  Date Type Provider Dept   10/09/24 Office Visit Sona Delong MD [id number]   Showing recent visits within past 30 days and meeting all other requirements  Future Appointments  Date Type Provider Dept   11/04/24 Appointment Sona Delong MD [id number]   Showing future appointments within next 90 days and meeting all other requirements

## 2024-10-29 ENCOUNTER — PATIENT OUTREACH (OUTPATIENT)
Dept: HOME HEALTH SERVICES | Age: 64
End: 2024-10-29
Payer: COMMERCIAL

## 2024-10-29 VITALS — OXYGEN SATURATION: 95 % | HEART RATE: 85 BPM

## 2024-10-31 ENCOUNTER — PATIENT OUTREACH (OUTPATIENT)
Dept: HOME HEALTH SERVICES | Age: 64
End: 2024-10-31
Payer: COMMERCIAL

## 2024-11-02 ENCOUNTER — PATIENT OUTREACH (OUTPATIENT)
Dept: HOME HEALTH SERVICES | Age: 64
End: 2024-11-02

## 2024-11-02 ENCOUNTER — APPOINTMENT (OUTPATIENT)
Dept: CARE COORDINATION | Age: 64
End: 2024-11-02
Payer: COMMERCIAL

## 2024-11-02 DIAGNOSIS — J18.9 PNEUMONIA OF BOTH LUNGS DUE TO INFECTIOUS ORGANISM, UNSPECIFIED PART OF LUNG: ICD-10-CM

## 2024-11-02 DIAGNOSIS — F17.200 TOBACCO USE DISORDER: Primary | ICD-10-CM

## 2024-11-02 DIAGNOSIS — J43.2 CENTRILOBULAR EMPHYSEMA (MULTI): ICD-10-CM

## 2024-11-04 ENCOUNTER — APPOINTMENT (OUTPATIENT)
Age: 64
End: 2024-11-04
Payer: COMMERCIAL

## 2024-11-04 VITALS
BODY MASS INDEX: 17 KG/M2 | WEIGHT: 114.8 LBS | DIASTOLIC BLOOD PRESSURE: 78 MMHG | SYSTOLIC BLOOD PRESSURE: 140 MMHG | HEART RATE: 100 BPM | HEIGHT: 69 IN | OXYGEN SATURATION: 95 %

## 2024-11-04 DIAGNOSIS — E16.9 DISORDER OF PANCREATIC INTERNAL SECRETION (HHS-HCC): ICD-10-CM

## 2024-11-04 DIAGNOSIS — D69.6 THROMBOCYTOPENIA (CMS-HCC): ICD-10-CM

## 2024-11-04 DIAGNOSIS — F17.200 TOBACCO USE DISORDER: ICD-10-CM

## 2024-11-04 DIAGNOSIS — E55.9 VITAMIN D DEFICIENCY: ICD-10-CM

## 2024-11-04 DIAGNOSIS — R73.09 ELEVATED GLUCOSE LEVEL: ICD-10-CM

## 2024-11-04 DIAGNOSIS — J43.2 CENTRILOBULAR EMPHYSEMA (MULTI): ICD-10-CM

## 2024-11-04 PROCEDURE — 99214 OFFICE O/P EST MOD 30 MIN: CPT | Performed by: FAMILY MEDICINE

## 2024-11-04 PROCEDURE — 3008F BODY MASS INDEX DOCD: CPT | Performed by: FAMILY MEDICINE

## 2024-11-04 RX ORDER — IBUPROFEN 200 MG
1 TABLET ORAL EVERY 24 HOURS
Qty: 30 PATCH | Refills: 0 | Status: SHIPPED | OUTPATIENT
Start: 2024-11-04 | End: 2024-12-04

## 2024-11-04 NOTE — PROGRESS NOTES
Subjective   Patient ID: Dallin Crawford is a 64 y.o. male who presents for 6 month follow up. Seen his new Pulmonary and states he feels better     HPI     Review of Systems    Objective   There were no vitals taken for this visit.    Physical Exam    Assessment/Plan

## 2024-11-04 NOTE — LETTER
November 4, 2024     Patient: Dallin Crawford   YOB: 1960   Date of Visit: 11/4/2024       To Whom It May Concern:    Dallin Crawford was seen in my clinic on 11/4/2024 at 9:20 am. He continues to be unable to work due to his ongoing issues with COPD and requiring oxygen.    If you have any questions or concerns, please don't hesitate to call.         Sincerely,         Sona Delong MD        CC: No Recipients

## 2024-11-04 NOTE — LETTER
November 4, 2024     Patient: Dallin Crawford   YOB: 1960   Date of Visit: 11/4/2024       To Whom It May Concern:    Dallin Crawford was seen in my clinic on 11/4/2024 at 9:20 am. Unable to work due to continued issues with COPD, is oxygen dependent.  Off work through 2/5/25.    If you have any questions or concerns, please don't hesitate to call.         Sincerely,         Sona Delong MD        CC: No Recipients

## 2024-11-04 NOTE — PROGRESS NOTES
Subjective   Dallin Crawford is a 64 y.o. male who presents for No chief complaint on file..  Here for routine f/u pancreatic insufficiency, COPD (Dr Rodriguez).  He has seen his new pulmonologist and states that he is doing better than he was. He is still on oxygen.  He has had some labs done, had ECHO, CT, breathing test and he just did a sleep study.  His platelet count was lower recently and he will be seeing heme/onc soon.      He is unable to work while using oxygen.  We will write him off for the next 3 months, but I suspect that he will not be able to return to work.              Objective   Visit Vitals  /78 (BP Location: Left arm, Patient Position: Sitting, BP Cuff Size: Adult)   Pulse 100      Physical Exam  Vitals reviewed.   Constitutional:       General: He is not in acute distress.  Cardiovascular:      Rate and Rhythm: Normal rate and regular rhythm.      Heart sounds: No murmur heard.  Pulmonary:      Effort: Pulmonary effort is normal. No respiratory distress.      Breath sounds: Normal breath sounds.   Skin:     General: Skin is warm and dry.   Neurological:      General: No focal deficit present.      Mental Status: He is alert. Mental status is at baseline.         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

## 2024-11-06 ENCOUNTER — PATIENT OUTREACH (OUTPATIENT)
Age: 64
End: 2024-11-06
Payer: COMMERCIAL

## 2024-11-06 NOTE — PROGRESS NOTES
Unable to reach patient for discharge follow up call.   LVM with call back number for patient to call if needed   If no voicemail available call attempts x 2 were made to contact the patient to assist with any questions or concerns patient may have.    yes

## 2024-11-25 ENCOUNTER — APPOINTMENT (OUTPATIENT)
Dept: HEMATOLOGY/ONCOLOGY | Facility: CLINIC | Age: 64
End: 2024-11-25
Payer: COMMERCIAL

## 2024-12-04 DIAGNOSIS — F17.200 TOBACCO USE DISORDER: ICD-10-CM

## 2024-12-05 ENCOUNTER — PATIENT OUTREACH (OUTPATIENT)
Age: 64
End: 2024-12-05
Payer: COMMERCIAL

## 2024-12-05 RX ORDER — IBUPROFEN 200 MG
1 TABLET ORAL EVERY 24 HOURS
Qty: 28 PATCH | Refills: 0 | Status: SHIPPED | OUTPATIENT
Start: 2024-12-05 | End: 2025-01-04

## 2025-02-03 DIAGNOSIS — F17.200 TOBACCO USE DISORDER: ICD-10-CM

## 2025-02-04 RX ORDER — IBUPROFEN 200 MG
TABLET ORAL
Qty: 28 PATCH | Refills: 0 | Status: SHIPPED | OUTPATIENT
Start: 2025-02-04

## 2025-02-10 ENCOUNTER — APPOINTMENT (OUTPATIENT)
Age: 65
End: 2025-02-10
Payer: COMMERCIAL

## 2025-02-10 VITALS
WEIGHT: 124.6 LBS | HEART RATE: 94 BPM | SYSTOLIC BLOOD PRESSURE: 140 MMHG | BODY MASS INDEX: 18.4 KG/M2 | DIASTOLIC BLOOD PRESSURE: 78 MMHG | OXYGEN SATURATION: 96 %

## 2025-02-10 DIAGNOSIS — R73.09 ELEVATED GLUCOSE LEVEL: Primary | ICD-10-CM

## 2025-02-10 DIAGNOSIS — J44.9 CHRONIC OBSTRUCTIVE PULMONARY DISEASE, UNSPECIFIED COPD TYPE (MULTI): ICD-10-CM

## 2025-02-10 DIAGNOSIS — K86.1 CHRONIC PANCREATITIS, UNSPECIFIED PANCREATITIS TYPE (MULTI): ICD-10-CM

## 2025-02-10 DIAGNOSIS — D69.6 THROMBOCYTOPENIA (CMS-HCC): ICD-10-CM

## 2025-02-10 PROCEDURE — 99214 OFFICE O/P EST MOD 30 MIN: CPT | Performed by: FAMILY MEDICINE

## 2025-02-10 PROCEDURE — 1036F TOBACCO NON-USER: CPT | Performed by: FAMILY MEDICINE

## 2025-02-10 RX ORDER — MONTELUKAST SODIUM 10 MG/1
10 TABLET ORAL NIGHTLY
Qty: 90 TABLET | Refills: 1 | Status: SHIPPED | OUTPATIENT
Start: 2025-02-10

## 2025-02-10 RX ORDER — BUDESONIDE, GLYCOPYRROLATE, AND FORMOTEROL FUMARATE 160; 9; 4.8 UG/1; UG/1; UG/1
2 AEROSOL, METERED RESPIRATORY (INHALATION) EVERY 12 HOURS
Qty: 24 G | Refills: 2 | Status: SHIPPED | OUTPATIENT
Start: 2025-02-10 | End: 2025-05-11

## 2025-02-10 NOTE — PROGRESS NOTES
Subjective   Dallin Crawford is a 64 y.o. male who presents for Follow-up (3 month-no concerns).  Here for routine f/u pancreatic insufficiency, COPD (Dr Rodriguez), thrombocytopenia.   He continues to struggle with shortness of breath with the cold weather.  He is still oxygen dependent.  He will be having CT soon and follow up with Dr Rodriguez.  He has retired from work and is now on [employer] disability.      He is not using chantix - he states that he uses a patch, occasionally smokes but takes the patch off when he smokes.              Objective   Visit Vitals  /78   Pulse 94      Physical Exam  Vitals reviewed.   Constitutional:       General: He is not in acute distress.  Cardiovascular:      Rate and Rhythm: Normal rate and regular rhythm.      Heart sounds: No murmur heard.  Pulmonary:      Effort: Pulmonary effort is normal. No respiratory distress.      Breath sounds: Normal breath sounds.   Skin:     General: Skin is warm and dry.   Neurological:      General: No focal deficit present.      Mental Status: He is alert. Mental status is at baseline.         Assessment/Plan   Problem List Items Addressed This Visit       Chronic pancreatitis (Multi)    Relevant Orders    CBC and Auto Differential    Comprehensive Metabolic Panel    Hemoglobin A1C    Lipid Panel    TSH with reflex to Free T4 if abnormal    Vitamin B12    Elevated glucose level - Primary    Relevant Orders    CBC and Auto Differential    Comprehensive Metabolic Panel    Hemoglobin A1C    Lipid Panel    TSH with reflex to Free T4 if abnormal    Vitamin B12    Thrombocytopenia (CMS-HCC)    Relevant Orders    CBC and Auto Differential    Comprehensive Metabolic Panel    Hemoglobin A1C    Lipid Panel    TSH with reflex to Free T4 if abnormal    Vitamin B12     Other Visit Diagnoses       Chronic obstructive pulmonary disease, unspecified COPD type (Multi)        Relevant Medications    [facility] 160-9-4.8 mcg/actuation HFA aerosol inhaler     montelukast (Singulair) 10 mg tablet    Other Relevant Orders    CBC and Auto Differential    Comprehensive Metabolic Panel    Hemoglobin A1C    Lipid Panel    TSH with reflex to Free T4 if abnormal    Vitamin B12               Sona Delong MD

## 2025-03-10 DIAGNOSIS — F17.200 TOBACCO USE DISORDER: ICD-10-CM

## 2025-03-10 RX ORDER — IBUPROFEN 200 MG
TABLET ORAL
Qty: 28 PATCH | Refills: 0 | Status: SHIPPED | OUTPATIENT
Start: 2025-03-10

## 2025-04-02 ENCOUNTER — APPOINTMENT (OUTPATIENT)
Dept: RADIOLOGY | Facility: HOSPITAL | Age: 65
End: 2025-04-02
Payer: COMMERCIAL

## 2025-04-07 ENCOUNTER — APPOINTMENT (OUTPATIENT)
Dept: RADIOLOGY | Facility: HOSPITAL | Age: 65
End: 2025-04-07
Payer: COMMERCIAL

## 2025-04-10 ENCOUNTER — APPOINTMENT (OUTPATIENT)
Dept: PULMONOLOGY | Facility: CLINIC | Age: 65
End: 2025-04-10
Payer: COMMERCIAL

## 2025-04-11 ENCOUNTER — TELEPHONE (OUTPATIENT)
Age: 65
End: 2025-04-11
Payer: COMMERCIAL

## 2025-04-11 NOTE — TELEPHONE ENCOUNTER
Buck called stating patient asked if Dr. vivar would fill out long term disability papers. She said Dr. Vivar declined to fill these out and would like to know if you can fill out instead?

## 2025-04-14 ENCOUNTER — APPOINTMENT (OUTPATIENT)
Dept: PULMONOLOGY | Facility: CLINIC | Age: 65
End: 2025-04-14
Payer: COMMERCIAL

## 2025-05-15 DIAGNOSIS — F17.200 TOBACCO USE DISORDER: ICD-10-CM

## 2025-05-15 DIAGNOSIS — J44.9 CHRONIC OBSTRUCTIVE PULMONARY DISEASE, UNSPECIFIED COPD TYPE (MULTI): ICD-10-CM

## 2025-05-15 RX ORDER — IBUPROFEN 200 MG
TABLET ORAL
Qty: 28 PATCH | Refills: 0 | Status: SHIPPED | OUTPATIENT
Start: 2025-05-15

## 2025-05-15 RX ORDER — BUDESONIDE, GLYCOPYRROLATE, AND FORMOTEROL FUMARATE 160; 9; 4.8 UG/1; UG/1; UG/1
2 AEROSOL, METERED RESPIRATORY (INHALATION) EVERY 12 HOURS
Qty: 10.7 G | Refills: 2 | Status: SHIPPED | OUTPATIENT
Start: 2025-05-15

## 2025-06-13 DIAGNOSIS — F17.200 TOBACCO USE DISORDER: ICD-10-CM

## 2025-06-13 DIAGNOSIS — J44.9 CHRONIC OBSTRUCTIVE PULMONARY DISEASE, UNSPECIFIED COPD TYPE (MULTI): ICD-10-CM

## 2025-06-16 RX ORDER — IBUPROFEN 200 MG
TABLET ORAL
Qty: 28 PATCH | Refills: 0 | Status: SHIPPED | OUTPATIENT
Start: 2025-06-16

## 2025-06-16 RX ORDER — MONTELUKAST SODIUM 10 MG/1
10 TABLET ORAL NIGHTLY
Qty: 90 TABLET | Refills: 1 | Status: SHIPPED | OUTPATIENT
Start: 2025-06-16

## 2025-07-22 DIAGNOSIS — F17.200 TOBACCO USE DISORDER: ICD-10-CM

## 2025-07-22 RX ORDER — IBUPROFEN 200 MG
TABLET ORAL
Qty: 28 PATCH | Refills: 0 | Status: SHIPPED | OUTPATIENT
Start: 2025-07-22

## 2025-08-09 LAB
ALBUMIN SERPL-MCNC: 4.4 G/DL (ref 3.6–5.1)
ALP SERPL-CCNC: 149 U/L (ref 35–144)
ALT SERPL-CCNC: 11 U/L (ref 9–46)
ANION GAP SERPL CALCULATED.4IONS-SCNC: 7 MMOL/L (CALC) (ref 7–17)
AST SERPL-CCNC: 13 U/L (ref 10–35)
BASOPHILS # BLD AUTO: 102 CELLS/UL (ref 0–200)
BASOPHILS NFR BLD AUTO: 1.6 %
BILIRUB SERPL-MCNC: 0.5 MG/DL (ref 0.2–1.2)
BUN SERPL-MCNC: 7 MG/DL (ref 7–25)
CALCIUM SERPL-MCNC: 9 MG/DL (ref 8.6–10.3)
CHLORIDE SERPL-SCNC: 99 MMOL/L (ref 98–110)
CHOLEST SERPL-MCNC: 178 MG/DL
CHOLEST/HDLC SERPL: 3.9 (CALC)
CO2 SERPL-SCNC: 33 MMOL/L (ref 20–32)
CREAT SERPL-MCNC: 0.65 MG/DL (ref 0.7–1.35)
EGFRCR SERPLBLD CKD-EPI 2021: 105 ML/MIN/1.73M2
EOSINOPHIL # BLD AUTO: 282 CELLS/UL (ref 15–500)
EOSINOPHIL NFR BLD AUTO: 4.4 %
ERYTHROCYTE [DISTWIDTH] IN BLOOD BY AUTOMATED COUNT: 12.7 % (ref 11–15)
EST. AVERAGE GLUCOSE BLD GHB EST-MCNC: 137 MG/DL
EST. AVERAGE GLUCOSE BLD GHB EST-SCNC: 7.6 MMOL/L
GLUCOSE SERPL-MCNC: 112 MG/DL (ref 65–99)
HBA1C MFR BLD: 6.4 %
HCT VFR BLD AUTO: 43.8 % (ref 38.5–50)
HDLC SERPL-MCNC: 46 MG/DL
HGB BLD-MCNC: 14.1 G/DL (ref 13.2–17.1)
LDLC SERPL CALC-MCNC: 111 MG/DL (CALC)
LYMPHOCYTES # BLD AUTO: 1587 CELLS/UL (ref 850–3900)
LYMPHOCYTES NFR BLD AUTO: 24.8 %
MCH RBC QN AUTO: 29.2 PG (ref 27–33)
MCHC RBC AUTO-ENTMCNC: 32.2 G/DL (ref 32–36)
MCV RBC AUTO: 90.7 FL (ref 80–100)
MONOCYTES # BLD AUTO: 435 CELLS/UL (ref 200–950)
MONOCYTES NFR BLD AUTO: 6.8 %
NEUTROPHILS # BLD AUTO: 3994 CELLS/UL (ref 1500–7800)
NEUTROPHILS NFR BLD AUTO: 62.4 %
NONHDLC SERPL-MCNC: 132 MG/DL (CALC)
PLATELET # BLD AUTO: NORMAL THOUSAND/UL
POTASSIUM SERPL-SCNC: 3.8 MMOL/L (ref 3.5–5.3)
PROT SERPL-MCNC: 7 G/DL (ref 6.1–8.1)
RBC # BLD AUTO: 4.83 MILLION/UL (ref 4.2–5.8)
SODIUM SERPL-SCNC: 139 MMOL/L (ref 135–146)
TRIGL SERPL-MCNC: 99 MG/DL
TSH SERPL-ACNC: 1.72 MIU/L (ref 0.4–4.5)
VIT B12 SERPL-MCNC: 330 PG/ML (ref 200–1100)
WBC # BLD AUTO: 6.4 THOUSAND/UL (ref 3.8–10.8)

## 2025-08-11 ENCOUNTER — APPOINTMENT (OUTPATIENT)
Age: 65
End: 2025-08-11
Payer: COMMERCIAL

## 2025-08-11 VITALS
WEIGHT: 118 LBS | SYSTOLIC BLOOD PRESSURE: 136 MMHG | DIASTOLIC BLOOD PRESSURE: 70 MMHG | BODY MASS INDEX: 17.48 KG/M2 | HEART RATE: 67 BPM | OXYGEN SATURATION: 96 % | HEIGHT: 69 IN

## 2025-08-11 DIAGNOSIS — R73.09 ELEVATED GLUCOSE: ICD-10-CM

## 2025-08-11 DIAGNOSIS — F17.200 TOBACCO USE DISORDER: ICD-10-CM

## 2025-08-11 DIAGNOSIS — J44.9 CHRONIC OBSTRUCTIVE PULMONARY DISEASE, UNSPECIFIED COPD TYPE (MULTI): ICD-10-CM

## 2025-08-11 DIAGNOSIS — E16.9 DISORDER OF PANCREATIC INTERNAL SECRETION (HHS-HCC): ICD-10-CM

## 2025-08-11 DIAGNOSIS — J43.2 CENTRILOBULAR EMPHYSEMA (MULTI): Primary | ICD-10-CM

## 2025-08-11 PROCEDURE — 3008F BODY MASS INDEX DOCD: CPT | Performed by: FAMILY MEDICINE

## 2025-08-11 PROCEDURE — 99214 OFFICE O/P EST MOD 30 MIN: CPT | Performed by: FAMILY MEDICINE

## 2025-08-11 RX ORDER — PANCRELIPASE 36000; 180000; 114000 [USP'U]/1; [USP'U]/1; [USP'U]/1
2 CAPSULE, DELAYED RELEASE PELLETS ORAL
Qty: 540 CAPSULE | Refills: 3 | Status: SHIPPED | OUTPATIENT
Start: 2025-08-11

## 2025-08-11 RX ORDER — MONTELUKAST SODIUM 10 MG/1
10 TABLET ORAL NIGHTLY
Qty: 90 TABLET | Refills: 1 | Status: SHIPPED | OUTPATIENT
Start: 2025-08-11

## 2025-08-11 RX ORDER — IBUPROFEN 200 MG
1 TABLET ORAL EVERY 24 HOURS
Qty: 30 PATCH | Refills: 0 | Status: SHIPPED | OUTPATIENT
Start: 2025-08-11 | End: 2025-09-10

## 2025-08-11 RX ORDER — BUDESONIDE, GLYCOPYRROLATE, AND FORMOTEROL FUMARATE 160; 9; 4.8 UG/1; UG/1; UG/1
2 AEROSOL, METERED RESPIRATORY (INHALATION) EVERY 12 HOURS
Qty: 10.7 G | Refills: 11 | Status: SHIPPED | OUTPATIENT
Start: 2025-08-11

## 2026-02-16 ENCOUNTER — APPOINTMENT (OUTPATIENT)
Age: 66
End: 2026-02-16
Payer: COMMERCIAL